# Patient Record
Sex: FEMALE | Race: BLACK OR AFRICAN AMERICAN | Employment: OTHER | URBAN - METROPOLITAN AREA
[De-identification: names, ages, dates, MRNs, and addresses within clinical notes are randomized per-mention and may not be internally consistent; named-entity substitution may affect disease eponyms.]

---

## 2017-10-27 ENCOUNTER — OFFICE VISIT (OUTPATIENT)
Dept: INTERNAL MEDICINE CLINIC | Age: 62
End: 2017-10-27

## 2017-10-27 VITALS
SYSTOLIC BLOOD PRESSURE: 138 MMHG | BODY MASS INDEX: 37.38 KG/M2 | RESPIRATION RATE: 16 BRPM | HEART RATE: 72 BPM | WEIGHT: 198 LBS | HEIGHT: 61 IN | DIASTOLIC BLOOD PRESSURE: 76 MMHG

## 2017-10-27 DIAGNOSIS — D50.0 IRON DEFICIENCY ANEMIA DUE TO CHRONIC BLOOD LOSS: ICD-10-CM

## 2017-10-27 DIAGNOSIS — Z00.00 PREVENTATIVE HEALTH CARE: ICD-10-CM

## 2017-10-27 DIAGNOSIS — E04.9 GOITER: ICD-10-CM

## 2017-10-27 DIAGNOSIS — E66.09 CLASS 2 OBESITY DUE TO EXCESS CALORIES WITHOUT SERIOUS COMORBIDITY WITH BODY MASS INDEX (BMI) OF 37.0 TO 37.9 IN ADULT: ICD-10-CM

## 2017-10-27 DIAGNOSIS — D50.0 IRON DEFICIENCY ANEMIA DUE TO CHRONIC BLOOD LOSS: Primary | ICD-10-CM

## 2017-10-27 LAB
ALBUMIN SERPL-MCNC: 4.4 G/DL (ref 3.4–5)
ANION GAP SERPL CALCULATED.3IONS-SCNC: 15 MMOL/L (ref 3–16)
BASOPHILS ABSOLUTE: 0 K/UL (ref 0–0.2)
BASOPHILS RELATIVE PERCENT: 0.8 %
BUN BLDV-MCNC: 11 MG/DL (ref 7–20)
CALCIUM SERPL-MCNC: 10.3 MG/DL (ref 8.3–10.6)
CHLORIDE BLD-SCNC: 98 MMOL/L (ref 99–110)
CHOLESTEROL, TOTAL: 211 MG/DL (ref 0–199)
CO2: 28 MMOL/L (ref 21–32)
CREAT SERPL-MCNC: 0.7 MG/DL (ref 0.6–1.2)
EOSINOPHILS ABSOLUTE: 0.1 K/UL (ref 0–0.6)
EOSINOPHILS RELATIVE PERCENT: 2.4 %
FERRITIN: 20.2 NG/ML (ref 15–150)
GFR AFRICAN AMERICAN: >60
GFR NON-AFRICAN AMERICAN: >60
GLUCOSE BLD-MCNC: 87 MG/DL (ref 70–99)
HCT VFR BLD CALC: 37.4 % (ref 36–48)
HDLC SERPL-MCNC: 47 MG/DL (ref 40–60)
HEMOGLOBIN: 12 G/DL (ref 12–16)
HEPATITIS C ANTIBODY INTERPRETATION: NORMAL
IRON SATURATION: 23 % (ref 15–50)
IRON: 88 UG/DL (ref 37–145)
LDL CHOLESTEROL CALCULATED: 133 MG/DL
LYMPHOCYTES ABSOLUTE: 1.4 K/UL (ref 1–5.1)
LYMPHOCYTES RELATIVE PERCENT: 30.8 %
MCH RBC QN AUTO: 27.2 PG (ref 26–34)
MCHC RBC AUTO-ENTMCNC: 32.2 G/DL (ref 31–36)
MCV RBC AUTO: 84.7 FL (ref 80–100)
MONOCYTES ABSOLUTE: 0.4 K/UL (ref 0–1.3)
MONOCYTES RELATIVE PERCENT: 9.1 %
NEUTROPHILS ABSOLUTE: 2.6 K/UL (ref 1.7–7.7)
NEUTROPHILS RELATIVE PERCENT: 56.9 %
PDW BLD-RTO: 15.1 % (ref 12.4–15.4)
PHOSPHORUS: 4.3 MG/DL (ref 2.5–4.9)
PLATELET # BLD: 288 K/UL (ref 135–450)
PMV BLD AUTO: 9 FL (ref 5–10.5)
POTASSIUM SERPL-SCNC: 4.7 MMOL/L (ref 3.5–5.1)
RBC # BLD: 4.42 M/UL (ref 4–5.2)
SODIUM BLD-SCNC: 141 MMOL/L (ref 136–145)
T4 FREE: 1.5 NG/DL (ref 0.9–1.8)
TOTAL IRON BINDING CAPACITY: 384 UG/DL (ref 260–445)
TRIGL SERPL-MCNC: 157 MG/DL (ref 0–150)
TSH SERPL DL<=0.05 MIU/L-ACNC: 1 UIU/ML (ref 0.27–4.2)
VITAMIN B-12: 1472 PG/ML (ref 211–911)
VLDLC SERPL CALC-MCNC: 31 MG/DL
WBC # BLD: 4.6 K/UL (ref 4–11)

## 2017-10-27 PROCEDURE — G0008 ADMIN INFLUENZA VIRUS VAC: HCPCS | Performed by: INTERNAL MEDICINE

## 2017-10-27 PROCEDURE — 99203 OFFICE O/P NEW LOW 30 MIN: CPT | Performed by: INTERNAL MEDICINE

## 2017-10-27 PROCEDURE — 90686 IIV4 VACC NO PRSV 0.5 ML IM: CPT | Performed by: INTERNAL MEDICINE

## 2017-10-27 RX ORDER — THIAMINE HCL 100 MG
2500 TABLET ORAL DAILY
COMMUNITY
End: 2020-06-30 | Stop reason: DRUGHIGH

## 2017-10-27 ASSESSMENT — PATIENT HEALTH QUESTIONNAIRE - PHQ9
SUM OF ALL RESPONSES TO PHQ9 QUESTIONS 1 & 2: 0
SUM OF ALL RESPONSES TO PHQ QUESTIONS 1-9: 0
2. FEELING DOWN, DEPRESSED OR HOPELESS: 0
1. LITTLE INTEREST OR PLEASURE IN DOING THINGS: 0

## 2017-10-27 NOTE — PROGRESS NOTES
Vaccine Information Sheet, \"Influenza - Inactivated\"  given to Flory Haas, or parent/legal guardian of  Flory Haas and verbalized understanding. Patient responses:    Have you ever had a reaction to a flu vaccine? No  Are you able to eat eggs without adverse effects? Yes  Do you have any current illness? No  Have you ever had Guillian Peggs Syndrome? No    Flu vaccine given per order. Please see immunization tab.

## 2017-10-28 LAB
ESTIMATED AVERAGE GLUCOSE: 134.1 MG/DL
HBA1C MFR BLD: 6.3 %

## 2017-10-28 NOTE — PROGRESS NOTES
Oleksandr Holman  YOB: 1955    Date of Service:  10/27/2017    /76 (Site: Right Arm, Position: Sitting, Cuff Size: Large Adult)   Pulse 72 Comment: Regular  Resp 16   Ht 5' 1.25\" (1.556 m)   Wt 198 lb (89.8 kg)   LMP 10/12/2017   BMI 37.11 kg/m²       No Known Allergies  Outpatient Prescriptions Marked as Taking for the 10/27/17 encounter (Office Visit) with Tien Sam MD   Medication Sig Dispense Refill    Iron-Vitamin C (VITRON-C)  MG TABS Take 1 tablet by mouth daily      vitamin D 1000 units CAPS Take 1 capsule by mouth daily      Cyanocobalamin (VITAMIN B-12) 2500 MCG SUBL Place 2,500 mcg under the tongue daily      Multiple Vitamins-Minerals (CENTRUM SILVER 50+WOMEN PO) Take 1 tablet by mouth daily         History reviewed. No pertinent past medical history. Past Surgical History:   Procedure Laterality Date    LAPAROSCOPY      SHOULDER SURGERY         the medical history, surgical history, medications, and allergies listed above were verified by me with the patient and are currently accurate to the best of his / her and my knowledge    No family history on file. Social History     Social History    Marital status:      Spouse name: N/A    Number of children: N/A    Years of education: N/A     Occupational History    Not on file.      Social History Main Topics    Smoking status: Never Smoker    Smokeless tobacco: Never Used    Alcohol use No    Drug use: No    Sexual activity: Yes     Partners: Male     Other Topics Concern    Not on file     Social History Narrative    No narrative on file       Smoking history reviewed, cessation counseling performed: n/a    Chief Complaint   Patient presents with    New Patient       HPI    establish care, she feels well, she has Anemia and has been on iron infusions in the past, GI work up was negative and she denies bleeding, she is now on oral iron and vitamins, she had a thoracic mass biopsy which Health systems    I have ordered outside records where not available through EMR    Assessment:      ICD-10-CM ICD-9-CM    1. Iron deficiency anemia due to chronic blood loss D50.0 280.0 CBC WITH AUTO DIFFERENTIAL      RENAL FUNCTION PANEL      FERRITIN      IRON AND TIBC      VITAMIN B12   2. Class 2 obesity due to excess calories without serious comorbidity with body mass index (BMI) of 37.0 to 37.9 in adult E66.09 278.00     Z68.37 V85.37    3. Goiter E04.9 240.9 TSH without Reflex      T4, Free   4. Preventative health care Z00.00 V70.0 LIPID PANEL      HEMOGLOBIN A1C      HEPATITIS C ANTIBODY      HIV-1 AND HIV-2 ANTIBODIES           Plan:    Check CBC, B12 and iron levels  Check TFTs  Screen for DM, Cholesterol, Hep C, HIV  Vaccinate today    Orders Placed This Encounter   Procedures    INFLUENZA, QUADV, 3 YRS AND OLDER, IM, PF, PREFILL SYR OR SDV, 0.5ML (FLUZONE QUADV, PF)    CBC WITH AUTO DIFFERENTIAL    RENAL FUNCTION PANEL    TSH without Reflex    T4, Free    LIPID PANEL    HEMOGLOBIN A1C    HEPATITIS C ANTIBODY    HIV-1 AND HIV-2 ANTIBODIES    FERRITIN    IRON AND TIBC    VITAMIN B12          Medication List          Accurate as of 10/27/17  9:02 PM. If you have any questions, ask your nurse or doctor.                CONTINUE taking these medications    CENTRUM SILVER 50+WOMEN PO     Vitamin B-12 2500 MCG Subl     vitamin D 1000 units Caps     VITRON-C  MG Tabs  Generic drug:  Iron-Vitamin C            All medications, orders, and other aspects of the management plan were discussed and explained to the patient, all questions were answered fully, relevant significant possible side effects were described and appropriate precautions recommended, the patient expressed good understanding    25 mins spent counseling patient on health maintenance, healthy diet, exercise, other therapeutic lifestyle changes, guideline indicated screening and vaccines for preventable diseases, addressing patient's

## 2017-10-30 LAB — HIV-1 AND HIV-2 ANTIBODIES: NORMAL

## 2017-10-30 RX ORDER — METFORMIN HYDROCHLORIDE 500 MG/1
500 TABLET, EXTENDED RELEASE ORAL
Qty: 90 TABLET | Refills: 2 | Status: SHIPPED | OUTPATIENT
Start: 2017-10-30 | End: 2018-05-03 | Stop reason: SDUPTHER

## 2018-04-02 ENCOUNTER — HOSPITAL ENCOUNTER (OUTPATIENT)
Dept: MAMMOGRAPHY | Age: 63
Discharge: HOME | End: 2018-04-02
Attending: INTERNAL MEDICINE | Admitting: INTERNAL MEDICINE

## 2018-04-02 DIAGNOSIS — Z12.31 VISIT FOR SCREENING MAMMOGRAM: ICD-10-CM

## 2018-05-02 ENCOUNTER — OFFICE VISIT (OUTPATIENT)
Dept: INTERNAL MEDICINE CLINIC | Age: 63
End: 2018-05-02

## 2018-05-02 VITALS
WEIGHT: 196 LBS | SYSTOLIC BLOOD PRESSURE: 138 MMHG | BODY MASS INDEX: 37 KG/M2 | HEART RATE: 76 BPM | DIASTOLIC BLOOD PRESSURE: 84 MMHG | RESPIRATION RATE: 16 BRPM | HEIGHT: 61 IN

## 2018-05-02 DIAGNOSIS — G47.33 OSA (OBSTRUCTIVE SLEEP APNEA): ICD-10-CM

## 2018-05-02 DIAGNOSIS — E66.9 OBESITY (BMI 35.0-39.9 WITHOUT COMORBIDITY): ICD-10-CM

## 2018-05-02 DIAGNOSIS — H81.10 BENIGN PAROXYSMAL POSITIONAL VERTIGO, UNSPECIFIED LATERALITY: Primary | ICD-10-CM

## 2018-05-02 DIAGNOSIS — E78.2 MIXED HYPERLIPIDEMIA: ICD-10-CM

## 2018-05-02 DIAGNOSIS — R73.03 PRE-DIABETES: ICD-10-CM

## 2018-05-02 PROCEDURE — G8427 DOCREV CUR MEDS BY ELIG CLIN: HCPCS | Performed by: INTERNAL MEDICINE

## 2018-05-02 PROCEDURE — 1036F TOBACCO NON-USER: CPT | Performed by: INTERNAL MEDICINE

## 2018-05-02 PROCEDURE — 99214 OFFICE O/P EST MOD 30 MIN: CPT | Performed by: INTERNAL MEDICINE

## 2018-05-02 PROCEDURE — G8417 CALC BMI ABV UP PARAM F/U: HCPCS | Performed by: INTERNAL MEDICINE

## 2018-05-02 PROCEDURE — 3017F COLORECTAL CA SCREEN DOC REV: CPT | Performed by: INTERNAL MEDICINE

## 2018-05-02 RX ORDER — PAROXETINE 10 MG/1
10 TABLET, FILM COATED ORAL EVERY EVENING
Qty: 30 TABLET | Refills: 3 | Status: SHIPPED | OUTPATIENT
Start: 2018-05-02 | End: 2018-10-04

## 2018-05-03 RX ORDER — METFORMIN HYDROCHLORIDE 500 MG/1
500 TABLET, EXTENDED RELEASE ORAL
Qty: 90 TABLET | Refills: 2 | Status: SHIPPED | OUTPATIENT
Start: 2018-05-03 | End: 2018-05-04 | Stop reason: SDUPTHER

## 2018-05-07 RX ORDER — METFORMIN HYDROCHLORIDE 500 MG/1
500 TABLET, EXTENDED RELEASE ORAL
Qty: 90 TABLET | Refills: 2 | Status: SHIPPED | OUTPATIENT
Start: 2018-05-07 | End: 2018-05-07 | Stop reason: SDUPTHER

## 2018-05-07 RX ORDER — METFORMIN HYDROCHLORIDE 500 MG/1
500 TABLET, EXTENDED RELEASE ORAL
Qty: 90 TABLET | Refills: 2 | Status: SHIPPED | OUTPATIENT
Start: 2018-05-07 | End: 2019-06-17 | Stop reason: SDUPTHER

## 2018-05-08 ASSESSMENT — ENCOUNTER SYMPTOMS
DIARRHEA: 0
TROUBLE SWALLOWING: 0
COUGH: 0
BACK PAIN: 0
SHORTNESS OF BREATH: 0
BLOOD IN STOOL: 0
SORE THROAT: 0
ABDOMINAL DISTENTION: 0
APNEA: 0
NAUSEA: 0
EYE DISCHARGE: 0
VOMITING: 0
RHINORRHEA: 0
CONSTIPATION: 0
ABDOMINAL PAIN: 0
EYE PAIN: 0
WHEEZING: 0

## 2018-07-20 ENCOUNTER — OFFICE VISIT (OUTPATIENT)
Dept: GYNECOLOGY | Age: 63
End: 2018-07-20

## 2018-07-20 VITALS
SYSTOLIC BLOOD PRESSURE: 130 MMHG | HEART RATE: 78 BPM | DIASTOLIC BLOOD PRESSURE: 67 MMHG | BODY MASS INDEX: 37.22 KG/M2 | WEIGHT: 197 LBS

## 2018-07-20 DIAGNOSIS — Z76.89 ENCOUNTER TO ESTABLISH CARE: ICD-10-CM

## 2018-07-20 DIAGNOSIS — Z12.39 SCREENING FOR BREAST CANCER: ICD-10-CM

## 2018-07-20 DIAGNOSIS — Z12.4 SCREENING FOR CERVICAL CANCER: ICD-10-CM

## 2018-07-20 DIAGNOSIS — Z01.419 WOMEN'S ANNUAL ROUTINE GYNECOLOGICAL EXAMINATION: Primary | ICD-10-CM

## 2018-07-20 PROCEDURE — 99386 PREV VISIT NEW AGE 40-64: CPT | Performed by: NURSE PRACTITIONER

## 2018-07-20 NOTE — PATIENT INSTRUCTIONS
risk for heart attack and stroke. · Blood pressure. Have your blood pressure checked during a routine doctor visit. Your doctor will tell you how often to check your blood pressure based on your age, your blood pressure results, and other factors. · Mammogram. Ask your doctor how often you should have a mammogram, which is an X-ray of your breasts. A mammogram can spot breast cancer before it can be felt and when it is easiest to treat. · Pap test and pelvic exam. Ask your doctor how often you should have a Pap test. You may not need to have a Pap test as often as you used to. · Vision. Have your eyes checked every year or two or as often as your doctor suggests. Some experts recommend that you have yearly exams for glaucoma and other age-related eye problems starting at age 48. · Hearing. Tell your doctor if you notice any change in your hearing. You can have tests to find out how well you hear. · Diabetes. Ask your doctor whether you should have tests for diabetes. · Colon cancer. You should begin tests for colon cancer at age 48. You may have one of several tests. Your doctor will tell you how often to have tests based on your age and risk. Risks include whether you already had a precancerous polyp removed from your colon or whether your parents, sisters and brothers, or children have had colon cancer. · Thyroid disease. Talk to your doctor about whether to have your thyroid checked as part of a regular physical exam. Women have an increased chance of a thyroid problem. · Osteoporosis. You should begin tests for bone density at age 72. If you are younger than 72, ask your doctor whether you have factors that may increase your risk for this disease. You may want to have this test before age 72. · Heart attack and stroke risk. At least every 4 to 6 years, you should have your risk for heart attack and stroke assessed.  Your doctor uses factors such as your age, blood pressure, cholesterol, and whether you exam. Your doctor carefully feels your breasts and under your arms to check for lumps or other changes. After the screening, your doctor will tell you the results. You will also be told if you need any follow-up tests. When should you get screened? Talk with your doctor about when you should start being tested for breast cancer. How often you get tested and the kind of tests you get will depend on your age and your risk. The guidelines that follow are for women who have an average risk for breast cancer. If you have a higher risk for breast cancer, such as having a family history of breast cancer in multiple relatives or at a young age, your doctor may recommend different screening for you. · Ages 21 to 44: Some experts recommend that women have a clinical breast exam every 3 years, starting at age 21. Ask your doctor how often you should have this test. If you have a high risk for breast cancer, talk with your doctor about when to start yearly mammograms and other screening tests. · Ages 36 and older: Talk with your doctor about how often you should have mammograms and clinical breast exams. What is your risk for breast cancer? If you don't already know your risk of breast cancer, you can ask your doctor about it. You can also look it up at www.cancer.gov/bcrisktool/. If your doctor says that you have a high or very high risk, ask about ways to reduce your risk. These could include getting extra screening, taking medicine, or having surgery. If you have a strong family history of breast cancer, ask your doctor about genetic testing. What steps can you take to stay healthy? Some things that increase your risk of breast cancer, such as your age and being female, cannot be controlled. But you can do some things to stay as healthy as you can. · Learn what your breasts normally look and feel like. If you notice any changes, tell your doctor. · Drink alcohol wisely. Your risk goes up the more you drink.  For learn more about \"Breast Self-Exam: Care Instructions. \"     If you do not have an account, please click on the \"Sign Up Now\" link. Current as of: May 12, 2017  Content Version: 11.6  © 4863-0326 [employer]. Care instructions adapted under license by [facility] ([facility]). If you have questions about a medical condition or this instruction, always ask your healthcare professional. [address] any warranty or liability for your use of this information. Patient Education        Eating Healthy Foods: Care Instructions  Your Care Instructions    Eating healthy foods can help lower your risk for disease. Healthy food gives you energy and keeps your heart strong, your brain active, your muscles working, and your bones strong. A healthy diet includes a variety of foods from the basic food groups: grains, vegetables, fruits, milk and milk products, and meat and beans. Some people may eat more of their favorite foods from only one food group and, as a result, miss getting the nutrients they need. So, it is important to pay attention not only to what you eat but also to what you are missing from your diet. You can eat a healthy, balanced diet by making a few small changes. Follow-up care is a key part of your treatment and safety. Be sure to make and go to all appointments, and call your doctor if you are having problems. It's also a good idea to know your test results and keep a list of the medicines you take. How can you care for yourself at home? Look at what you eat  · Keep a food diary for a week or two and record everything you eat or drink. Track the number of servings you eat from each food group. · For a balanced diet every day, eat a variety of:  ¨ 6 or more ounce-equivalents of grains, such as cereals, breads, crackers, rice, or pasta, every day.  An ounce-equivalent is 1 slice of bread, 1 cup of ready-to-eat cereal, or ½ cup of cooked rice, cooked pasta, or cooked not cut them out entirely. · Eat a wide variety of foods. Make healthy choices when eating out  · The type of restaurant you choose can help you make healthy choices. Even fast-food chains are now offering more low-fat or healthier choices on the menu. · Choose smaller portions, or take half of your meal home. · When eating out, try:  ¨ A veggie pizza with a whole wheat crust or grilled chicken (instead of sausage or pepperoni). ¨ Pasta with roasted vegetables, grilled chicken, or marinara sauce instead of cream sauce. ¨ A vegetable wrap or grilled chicken wrap. ¨ Broiled or poached food instead of fried or breaded items. Make healthy choices easy  · Buy packaged, prewashed, ready-to-eat fresh vegetables and fruits, such as baby carrots, salad mixes, and chopped or shredded broccoli and cauliflower. · Buy packaged, presliced fruits, such as melon or pineapple. · Choose 100% fruit or vegetable juice instead of soda. Limit juice intake to 4 to 6 oz (½ to ¾ cup) a day. · Blend low-fat yogurt, fruit juice, and canned or frozen fruit to make a smoothie for breakfast or a snack. Where can you learn more? Go to https://Ecovision.YadaHome. org and sign in to your [employer] account. Enter L636 in the [facility] box to learn more about \"Eating Healthy Foods: Care Instructions. \"     If you do not have an account, please click on the \"Sign Up Now\" link. Current as of: May 12, 2017  Content Version: 11.6  © 1302-8657 [employer], [employer]ipple[employer]. Care instructions adapted under license by [facility] ([facility]). If you have questions about a medical condition or this instruction, always ask your healthcare professional. [place] [address] any warranty or liability for your use of this information. Patient Education        Learning About Physical Activity  What is physical activity? Physical activity is any kind of activity that gets your body moving.   The types of physical activity that can help you get fit and stay healthy include:  · Aerobic or \"cardio\" activities that make your heart beat faster and make you breathe harder, such as brisk walking, riding a bike, or running. Aerobic activities strengthen your heart and lungs and build up your endurance. · Strength training activities that make your muscles work against, or \"resist,\" something, such as lifting weights or doing push-ups. These activities help tone and strengthen your muscles. · Stretches that allow you to move your joints and muscles through their full range of motion. Stretching helps you be more flexible and avoid injury. What are the benefits of physical activity? Being active is one of the best things you can do to get fit and stay healthy. It helps you to:  · Feel stronger and have more energy to do all the things you like to do. · Focus better at school or work and perform better in sports. · Feel, think, and sleep better. · Reach and stay at a healthy weight. · Lose fat and build lean muscle. · Lower your risk for serious health problems. · Keep your bones, muscles, and joints strong. Being fit lets you do more physical activity. And it lets you work out harder without as much effort. How can you make physical activity part of your life? Get at least 30 minutes of exercise on most days of the week. Walking is a good choice. You also may want to do other activities, such as running, swimming, cycling, or playing tennis or team sports. Pick activities that you like-ones that make your heart beat faster, your muscles stronger, and your muscles and joints more flexible. If you find more than one thing you like doing, do them all. You don't have to do the same thing every day. Get your heart pumping every day. Any activity that makes your heart beat faster and keeps it at that rate for a while counts. Here are some great ways to get your heart beating faster:  · Go for a brisk walk, run, or bike ride.   · Go for a hike or swim. · Go in-line skating. · Play a game of touch football, basketball, or soccer. · Ride a bike. · Play tennis or racquetball. · Climb stairs. Even some household chores can be aerobic-just do them at a faster pace. Vacuuming, raking or mowing the lawn, sweeping the garage, and washing and waxing the car all can help get your heart rate up. Strengthen your muscles during the week. You don't have to lift heavy weights or grow big, bulky muscles to get stronger. Doing a few simple activities that make your muscles work against, or \"resist,\" something can help you get stronger. For example, you can:  · Do push-ups or sit-ups, which use your own body weight as resistance. · Lift weights or dumbbells or use stretch bands at home or in a gym or community center. Stretch your muscles often. Stretching will help you as you become more active. It can help you stay flexible, loosen tight muscles, and avoid injury. It can also help improve your balance and posture and can be a great way to relax. Be sure to stretch the muscles you'll be using when you work out. It's best to warm your muscles slightly before you stretch them. Walk or do some other light aerobic activity for a few minutes, and then start stretching. When you stretch your muscles:  · Do it slowly. Stretching is not about going fast or making sudden movements. · Don't push or bounce during a stretch. · Hold each stretch for at least 15 to 30 seconds, if you can. You should feel a stretch in the muscle, but not pain. · Breathe out as you do the stretch. Then breathe in as you hold the stretch. Don't hold your breath. If you're worried about how more activity might affect your health, have a checkup before you start. Follow any special advice your doctor gives you for getting a smart start. Where can you learn more? Go to https://kristal.[email or web address] org and sign in to your [employer] account.  Enter T669 in the [address]

## 2018-07-20 NOTE — PROGRESS NOTES
[facility]  Annual GYN Visit  Aurora Villa CNP    Sunil Martinez  YOB: 1955    Date of Service:  7/20/2018    Vitals:    07/20/18 0935   BP: 130/67   Site: Right Arm   Position: Sitting   Cuff Size: Large Adult   Pulse: 78   Weight: 197 lb (89.4 kg)     Body mass index is 37.22 kg/m². Wt Readings from Last 3 Encounters:   07/20/18 197 lb (89.4 kg)   05/02/18 196 lb (88.9 kg)   10/27/17 198 lb (89.8 kg)     BP Readings from Last 3 Encounters:   07/20/18 130/67   05/02/18 138/84   10/27/17 138/76       Chief Complaint:   Sunil Martinez is a 66 y.o. female who presents as a new patient for routine annual gynecologic visit. HPI:  Patient is nulliparous. She is perimenopausal- reports lmp in 3/2018. Menarche at 17years of age years. She denies dyspareunia, pelvic pain, vaginal discharge, vulvar/vaginal itching, vulvar/vaginal irritation/pain, urinary frequency, urinary urgency and urinary incontinence. Menopausal/perimenopausal symptoms: hot flashes, insomnia. Hormone therapy side effects: none. Patient is currently sexually active; monogamous with spouse. No Known Allergies  Outpatient Prescriptions Marked as Taking for the 7/20/18 encounter (Office Visit) with Rachell Pill, SALVATORE - CNP   Medication Sig Dispense Refill    metFORMIN (GLUCOPHAGE-XR) 500 MG extended release tablet Take 1 tablet by mouth daily (with breakfast) 90 tablet 2    atorvastatin (LIPITOR) 10 MG tablet Take 1 tablet by mouth daily 30 tablet 5    Iron-Vitamin C (VITRON-C)  MG TABS Take 1 tablet by mouth daily      vitamin D 1000 units CAPS Take 1 capsule by mouth daily      Cyanocobalamin (VITAMIN B-12) 2500 MCG SUBL Place 2,500 mcg under the tongue daily      Multiple Vitamins-Minerals (CENTRUM SILVER 50+WOMEN PO) Take 1 tablet by mouth daily         Review of Systems:  A comprehensive review of systems was negative except for what was noted in the HPI.     Physical Exam:  Constitutional: She is oriented to person, place, and time. She appears well-developed and well-nourished. No distress. Neck: No mass and no thyromegaly present. Cardiovascular: Normal rate, regular rhythm and normal heart sounds. No murmur heard. Pulmonary/Chest: Effort and breath sounds normal.   Abdominal: Soft, non-tender. No distension or masses. Genitourinary: normal external genitalia, vulva, vagina, cervix, uterus and adnexa and urethral meatus normal and bladder not palpable. Breast exam:  breasts appear normal, no suspicious masses, no skin or nipple changes or axillary nodes. Neurological: She is alert and oriented to person, place, and time. Skin: Skin is warm and dry. No rash noted. No erythema. Psychiatric: She has a normal mood and affect. Her behavior is normal.     Preventive Care and Risk Factor Assessment:  Hx abnormal PAP: no; last 5/30/2017  Self-breast exams: yes  Hx of abnormal mammogram: yes - biopsy left breast in 2012- calcifications ; last 4/2/2018- normal  Colonoscopy: yes; last 5/19/2017- polyps due in 2022  FH of breast cancer: no  FH of GYN cancer: yes - MGM with cervical cancer   Previous DEXA scan: no  Exercise: no regular exercise  Social History   Substance Use Topics    Smoking status: Never Smoker    Smokeless tobacco: Never Used    Alcohol use No      History   Sexual Activity    Sexual activity: Yes    Partners: Male     Hx of STD: no     Health Maintenance   Topic Date Due    Cervical cancer screen  04/04/1978    Shingles Vaccine (1 of 2 - 2 Dose Series) 04/04/2007    Flu vaccine (1) 09/01/2018    A1C test (Diabetic or Prediabetic)  05/02/2019    Breast cancer screen  04/02/2020    DTaP/Tdap/Td vaccine (2 - Td) 09/23/2020    Colon cancer screen colonoscopy  05/19/2022    Lipid screen  05/02/2023    Hepatitis C screen  Completed    HIV screen  Completed        Assessment/Plan:  1. Encounter to establish care  2.  Women's annual routine

## 2018-07-25 LAB
HPV COMMENT: NORMAL
HPV TYPE 16: NOT DETECTED
HPV TYPE 18: NOT DETECTED
HPVOH (OTHER TYPES): NOT DETECTED

## 2018-10-04 ENCOUNTER — OFFICE VISIT (OUTPATIENT)
Dept: INTERNAL MEDICINE CLINIC | Age: 63
End: 2018-10-04
Payer: MEDICAID

## 2018-10-04 VITALS
OXYGEN SATURATION: 98 % | HEART RATE: 84 BPM | RESPIRATION RATE: 14 BRPM | DIASTOLIC BLOOD PRESSURE: 84 MMHG | WEIGHT: 194 LBS | SYSTOLIC BLOOD PRESSURE: 158 MMHG | HEIGHT: 61 IN | BODY MASS INDEX: 36.63 KG/M2

## 2018-10-04 DIAGNOSIS — Z23 NEEDS FLU SHOT: ICD-10-CM

## 2018-10-04 DIAGNOSIS — D50.9 IRON DEFICIENCY ANEMIA, UNSPECIFIED IRON DEFICIENCY ANEMIA TYPE: ICD-10-CM

## 2018-10-04 DIAGNOSIS — E04.9 GOITER: ICD-10-CM

## 2018-10-04 DIAGNOSIS — R73.03 PRE-DIABETES: ICD-10-CM

## 2018-10-04 DIAGNOSIS — D50.0 IRON DEFICIENCY ANEMIA DUE TO CHRONIC BLOOD LOSS: ICD-10-CM

## 2018-10-04 DIAGNOSIS — E78.00 HIGH CHOLESTEROL: Primary | ICD-10-CM

## 2018-10-04 DIAGNOSIS — E78.00 HIGH CHOLESTEROL: ICD-10-CM

## 2018-10-04 DIAGNOSIS — Z11.59 NEED FOR HEPATITIS C SCREENING TEST: ICD-10-CM

## 2018-10-04 DIAGNOSIS — Z23 NEED FOR PROPHYLACTIC VACCINATION AND INOCULATION AGAINST VARICELLA: ICD-10-CM

## 2018-10-04 DIAGNOSIS — I10 ESSENTIAL HYPERTENSION: ICD-10-CM

## 2018-10-04 LAB
A/G RATIO: 1.8 (ref 1.1–2.2)
ALBUMIN SERPL-MCNC: 4.2 G/DL (ref 3.4–5)
ALP BLD-CCNC: 109 U/L (ref 40–129)
ALT SERPL-CCNC: 19 U/L (ref 10–40)
ANION GAP SERPL CALCULATED.3IONS-SCNC: 15 MMOL/L (ref 3–16)
AST SERPL-CCNC: 18 U/L (ref 15–37)
BASOPHILS ABSOLUTE: 0.1 K/UL (ref 0–0.2)
BASOPHILS RELATIVE PERCENT: 1.2 %
BILIRUB SERPL-MCNC: <0.2 MG/DL (ref 0–1)
BUN BLDV-MCNC: 11 MG/DL (ref 7–20)
CALCIUM SERPL-MCNC: 9.6 MG/DL (ref 8.3–10.6)
CHLORIDE BLD-SCNC: 104 MMOL/L (ref 99–110)
CHOLESTEROL, TOTAL: 167 MG/DL (ref 0–199)
CO2: 26 MMOL/L (ref 21–32)
CREAT SERPL-MCNC: 0.7 MG/DL (ref 0.6–1.2)
EOSINOPHILS ABSOLUTE: 0.1 K/UL (ref 0–0.6)
EOSINOPHILS RELATIVE PERCENT: 3 %
FERRITIN: 74.1 NG/ML (ref 15–150)
GFR AFRICAN AMERICAN: >60
GFR NON-AFRICAN AMERICAN: >60
GLOBULIN: 2.4 G/DL
GLUCOSE BLD-MCNC: 101 MG/DL (ref 70–99)
HCT VFR BLD CALC: 38.6 % (ref 36–48)
HDLC SERPL-MCNC: 47 MG/DL (ref 40–60)
HEMOGLOBIN: 12.8 G/DL (ref 12–16)
HEPATITIS C ANTIBODY INTERPRETATION: NORMAL
IRON SATURATION: 26 % (ref 15–50)
IRON: 77 UG/DL (ref 37–145)
LDL CHOLESTEROL CALCULATED: 96 MG/DL
LYMPHOCYTES ABSOLUTE: 1.5 K/UL (ref 1–5.1)
LYMPHOCYTES RELATIVE PERCENT: 31.6 %
MCH RBC QN AUTO: 29 PG (ref 26–34)
MCHC RBC AUTO-ENTMCNC: 33.3 G/DL (ref 31–36)
MCV RBC AUTO: 87.3 FL (ref 80–100)
MONOCYTES ABSOLUTE: 0.4 K/UL (ref 0–1.3)
MONOCYTES RELATIVE PERCENT: 7.9 %
NEUTROPHILS ABSOLUTE: 2.6 K/UL (ref 1.7–7.7)
NEUTROPHILS RELATIVE PERCENT: 56.3 %
PDW BLD-RTO: 14.4 % (ref 12.4–15.4)
PLATELET # BLD: 264 K/UL (ref 135–450)
PMV BLD AUTO: 8.6 FL (ref 5–10.5)
POTASSIUM SERPL-SCNC: 4.4 MMOL/L (ref 3.5–5.1)
RBC # BLD: 4.42 M/UL (ref 4–5.2)
SODIUM BLD-SCNC: 145 MMOL/L (ref 136–145)
TOTAL IRON BINDING CAPACITY: 292 UG/DL (ref 260–445)
TOTAL PROTEIN: 6.6 G/DL (ref 6.4–8.2)
TRIGL SERPL-MCNC: 119 MG/DL (ref 0–150)
TSH REFLEX: 0.7 UIU/ML (ref 0.27–4.2)
VLDLC SERPL CALC-MCNC: 24 MG/DL
WBC # BLD: 4.7 K/UL (ref 4–11)

## 2018-10-04 PROCEDURE — 90686 IIV4 VACC NO PRSV 0.5 ML IM: CPT | Performed by: INTERNAL MEDICINE

## 2018-10-04 PROCEDURE — G8427 DOCREV CUR MEDS BY ELIG CLIN: HCPCS | Performed by: INTERNAL MEDICINE

## 2018-10-04 PROCEDURE — 93000 ELECTROCARDIOGRAM COMPLETE: CPT | Performed by: INTERNAL MEDICINE

## 2018-10-04 PROCEDURE — 99214 OFFICE O/P EST MOD 30 MIN: CPT | Performed by: INTERNAL MEDICINE

## 2018-10-04 PROCEDURE — 3017F COLORECTAL CA SCREEN DOC REV: CPT | Performed by: INTERNAL MEDICINE

## 2018-10-04 PROCEDURE — G0008 ADMIN INFLUENZA VIRUS VAC: HCPCS | Performed by: INTERNAL MEDICINE

## 2018-10-04 PROCEDURE — 1036F TOBACCO NON-USER: CPT | Performed by: INTERNAL MEDICINE

## 2018-10-04 PROCEDURE — G8417 CALC BMI ABV UP PARAM F/U: HCPCS | Performed by: INTERNAL MEDICINE

## 2018-10-04 PROCEDURE — G8482 FLU IMMUNIZE ORDER/ADMIN: HCPCS | Performed by: INTERNAL MEDICINE

## 2018-10-04 RX ORDER — AMLODIPINE BESYLATE 5 MG/1
5 TABLET ORAL DAILY
Qty: 30 TABLET | Refills: 3 | Status: SHIPPED | OUTPATIENT
Start: 2018-10-04 | End: 2018-10-22 | Stop reason: SDUPTHER

## 2018-10-04 ASSESSMENT — PATIENT HEALTH QUESTIONNAIRE - PHQ9
SUM OF ALL RESPONSES TO PHQ QUESTIONS 1-9: 0
SUM OF ALL RESPONSES TO PHQ9 QUESTIONS 1 & 2: 0
2. FEELING DOWN, DEPRESSED OR HOPELESS: 0
SUM OF ALL RESPONSES TO PHQ QUESTIONS 1-9: 0
1. LITTLE INTEREST OR PLEASURE IN DOING THINGS: 0

## 2018-10-04 ASSESSMENT — ENCOUNTER SYMPTOMS
SINUS PRESSURE: 0
PHOTOPHOBIA: 0
NAUSEA: 0
SHORTNESS OF BREATH: 0
SINUS PAIN: 0
CONSTIPATION: 0
EYE PAIN: 0
TROUBLE SWALLOWING: 0
VOMITING: 0
DIARRHEA: 0
COUGH: 0
ABDOMINAL PAIN: 0
COLOR CHANGE: 0
BACK PAIN: 0
WHEEZING: 0

## 2018-10-04 NOTE — PROGRESS NOTES
10/4/2018    Annie Oquendo (:  1955) is a 66 y.o. female, here for evaluation of the following medical concerns:    HPI  Hyperlipidemia:  No new myalgias or GI upset on atorvastatin (Lipitor). Medication compliance: compliant all of the time. Patient is  following a low fat, low cholesterol diet. She is not exercising regularly. Lab Results   Component Value Date    CHOL 208 (H) 2018    TRIG 91 2018    HDL 54 2018    LDLCALC 136 (H) 2018     No results found for: ALT, AST     Hypertension:   Elevated Bp readings at the dentist on 3 different occasions. 424P-735G systolic. Has strong family history of HTN. Sodium (mmol/L)   Date Value   10/27/2017 141    BUN (mg/dL)   Date Value   10/27/2017 11    Glucose (mg/dL)   Date Value   10/27/2017 87      Potassium (mmol/L)   Date Value   10/27/2017 4.7    CREATININE (mg/dL)   Date Value   10/27/2017 0.7         Prediabetes - down a few lbs. Wants to start exercising. Taking metofrm;in XR 500mg daily. Goiter - Discovered incidentally when she was being worked up for right shoulder surgery. She has enlarged right lobe of the thyroid that tracks down into her mediastinum. She is currently asymptomatic with no focal changes, shortness of breath or compressive symptoms. She had it biopsied and it showed normal thyroid tissue. She does not want to have surgery on this. Review of Systems   Constitutional: Negative for appetite change, fatigue, fever and unexpected weight change. No night sweats   HENT: Negative for congestion, ear pain, hearing loss, nosebleeds, sinus pain, sinus pressure, sneezing, tinnitus and trouble swallowing. Eyes: Negative for photophobia, pain and visual disturbance. Respiratory: Negative for cough, shortness of breath and wheezing. Cardiovascular: Negative for chest pain, palpitations and leg swelling.    Gastrointestinal: Negative for abdominal pain, History    Not on file. Social History Main Topics    Smoking status: Never Smoker    Smokeless tobacco: Never Used    Alcohol use No    Drug use: No    Sexual activity: Yes     Partners: Male     Other Topics Concern    Not on file     Social History Narrative    No narrative on file        Family History   Problem Relation Age of Onset    High Cholesterol Mother     Diabetes Mother     Thyroid Disease Mother     Anemia Mother     Dementia Mother     High Blood Pressure Father     Heart Failure Father     Stroke Father     Heart Disease Father     Heart Attack Father     Diabetes Sister     Heart Attack Sister 46    Arthritis Sister     Cervical Cancer Maternal Grandmother     No Known Problems Maternal Grandfather     No Known Problems Paternal Grandmother     Heart Attack Paternal Grandfather        Vitals:    10/04/18 1301 10/04/18 1350   BP: (!) 146/80 (!) 158/84   Pulse: 84    Resp: 14    SpO2: 98%    Weight: 194 lb (88 kg)    Height: 5' 1\" (1.549 m)      Estimated body mass index is 36.66 kg/m² as calculated from the following:    Height as of this encounter: 5' 1\" (1.549 m). Weight as of this encounter: 194 lb (88 kg). Physical Exam   Constitutional: She is oriented to person, place, and time. She appears well-developed and well-nourished. No distress. HENT:   Head: Normocephalic and atraumatic. Right Ear: Tympanic membrane, external ear and ear canal normal.   Left Ear: Tympanic membrane, external ear and ear canal normal.   Mouth/Throat: Oropharynx is clear and moist. No oropharyngeal exudate. Eyes: Pupils are equal, round, and reactive to light. EOM are normal. No scleral icterus. Neck: Normal range of motion. Neck supple. No JVD present. Thyromegaly (enlarged thyroid - non nodular) present. Cardiovascular: Normal rate, regular rhythm, normal heart sounds and intact distal pulses. Exam reveals no gallop and no friction rub.     No murmur heard.  Pulmonary/Chest: Effort normal and breath sounds normal. She has no wheezes. She has no rales. Abdominal: Soft. Bowel sounds are normal.   Musculoskeletal: Normal range of motion. She exhibits no edema, tenderness or deformity. Lymphadenopathy:     She has no cervical adenopathy. Neurological: She is alert and oriented to person, place, and time. Coordination normal.   Skin: Skin is warm and dry. No rash noted. She is not diaphoretic. Psychiatric: She has a normal mood and affect. Her behavior is normal.       ASSESSMENT/PLAN:  1. High cholesterol  Due for assessment after starting atorvastatin at her last visit with Dr. Jelani Song. She is not having any side effects from medication. We will continue this medication for now at this dose pending labs. No ASA for now given ?recent GI bleed? And LAITH. - Lipid Panel; Future  - COMPREHENSIVE METABOLIC PANEL; Future    2. Pre-diabetes  Encouraged exercise and weight loss. Continue metformin. No changes. 3. Essential hypertension  New diagnosis. Strong family history. EKG today with inferior q waves. Will get old EKG from [facility] to compare. Start amlodipine. If EKG shows dynamic changes from last check will consider more aggressive risk factor mgmt. - EKG 12 Lead  - amLODIPine (NORVASC) 5 MG tablet; Take 1 tablet by mouth daily  Dispense: 30 tablet; Refill: 3  - COMPREHENSIVE METABOLIC PANEL; Future    4. Needs flu shot  - Influenza, Quadv, 3 yrs and older, IM, PF, Prefill Syr or SDV, 0.5mL (FLUZONE QUADV, PF)    5. Iron deficiency anemia, unspecified iron deficiency anemia type  Had EGD and colonoscopy. Were considering capsule if anemia persisted. Will check today. - Ferritin; Future  - IRON AND TIBC; Future  - CBC Auto Differential; Future    6. Goiter  No compressive symptoms. Continue to monitor.   - TSH with Reflex; Future    7. Iron deficiency anemia due to chronic blood loss  - Ferritin; Future  - IRON AND TIBC;  Future  - CBC Auto

## 2018-10-22 ENCOUNTER — OFFICE VISIT (OUTPATIENT)
Dept: INTERNAL MEDICINE CLINIC | Age: 63
End: 2018-10-22
Payer: MEDICARE

## 2018-10-22 VITALS
OXYGEN SATURATION: 96 % | BODY MASS INDEX: 36.81 KG/M2 | DIASTOLIC BLOOD PRESSURE: 80 MMHG | RESPIRATION RATE: 14 BRPM | HEART RATE: 85 BPM | SYSTOLIC BLOOD PRESSURE: 150 MMHG | WEIGHT: 194.8 LBS

## 2018-10-22 DIAGNOSIS — I10 ESSENTIAL HYPERTENSION: ICD-10-CM

## 2018-10-22 DIAGNOSIS — E78.00 HIGH CHOLESTEROL: Primary | ICD-10-CM

## 2018-10-22 PROCEDURE — 1036F TOBACCO NON-USER: CPT | Performed by: INTERNAL MEDICINE

## 2018-10-22 PROCEDURE — 99213 OFFICE O/P EST LOW 20 MIN: CPT | Performed by: INTERNAL MEDICINE

## 2018-10-22 PROCEDURE — G8427 DOCREV CUR MEDS BY ELIG CLIN: HCPCS | Performed by: INTERNAL MEDICINE

## 2018-10-22 PROCEDURE — G8417 CALC BMI ABV UP PARAM F/U: HCPCS | Performed by: INTERNAL MEDICINE

## 2018-10-22 PROCEDURE — G8482 FLU IMMUNIZE ORDER/ADMIN: HCPCS | Performed by: INTERNAL MEDICINE

## 2018-10-22 PROCEDURE — 3017F COLORECTAL CA SCREEN DOC REV: CPT | Performed by: INTERNAL MEDICINE

## 2018-10-22 RX ORDER — AMLODIPINE BESYLATE 10 MG/1
10 TABLET ORAL DAILY
Qty: 30 TABLET | Refills: 3 | Status: SHIPPED | OUTPATIENT
Start: 2018-10-22 | End: 2019-03-19 | Stop reason: SDUPTHER

## 2018-10-22 ASSESSMENT — ENCOUNTER SYMPTOMS: SHORTNESS OF BREATH: 0

## 2018-11-19 ENCOUNTER — OFFICE VISIT (OUTPATIENT)
Dept: INTERNAL MEDICINE CLINIC | Age: 63
End: 2018-11-19
Payer: MEDICARE

## 2018-11-19 VITALS — HEART RATE: 80 BPM | DIASTOLIC BLOOD PRESSURE: 62 MMHG | SYSTOLIC BLOOD PRESSURE: 130 MMHG | RESPIRATION RATE: 14 BRPM

## 2018-11-19 DIAGNOSIS — N95.0 POSTMENOPAUSAL BLEEDING: ICD-10-CM

## 2018-11-19 DIAGNOSIS — I10 ESSENTIAL HYPERTENSION: Primary | ICD-10-CM

## 2018-11-19 PROCEDURE — G8482 FLU IMMUNIZE ORDER/ADMIN: HCPCS | Performed by: INTERNAL MEDICINE

## 2018-11-19 PROCEDURE — 99213 OFFICE O/P EST LOW 20 MIN: CPT | Performed by: INTERNAL MEDICINE

## 2018-11-19 PROCEDURE — 1036F TOBACCO NON-USER: CPT | Performed by: INTERNAL MEDICINE

## 2018-11-19 PROCEDURE — 3017F COLORECTAL CA SCREEN DOC REV: CPT | Performed by: INTERNAL MEDICINE

## 2018-11-19 PROCEDURE — G8417 CALC BMI ABV UP PARAM F/U: HCPCS | Performed by: INTERNAL MEDICINE

## 2018-11-19 PROCEDURE — G8427 DOCREV CUR MEDS BY ELIG CLIN: HCPCS | Performed by: INTERNAL MEDICINE

## 2018-11-19 ASSESSMENT — ENCOUNTER SYMPTOMS: SHORTNESS OF BREATH: 0

## 2018-12-03 DIAGNOSIS — I10 ESSENTIAL HYPERTENSION: ICD-10-CM

## 2018-12-03 RX ORDER — AMLODIPINE BESYLATE 10 MG/1
10 TABLET ORAL DAILY
Qty: 30 TABLET | Refills: 3 | Status: CANCELLED | OUTPATIENT
Start: 2018-12-03

## 2019-02-25 ENCOUNTER — HOSPITAL ENCOUNTER (OUTPATIENT)
Dept: ULTRASOUND IMAGING | Age: 64
Discharge: HOME | End: 2019-02-25
Payer: MEDICAID

## 2019-02-25 DIAGNOSIS — N95.0 POSTMENOPAUSAL BLEEDING: ICD-10-CM

## 2019-02-25 PROCEDURE — 76856 US EXAM PELVIC COMPLETE: CPT

## 2019-02-25 PROCEDURE — 76830 TRANSVAGINAL US NON-OB: CPT

## 2019-03-07 ENCOUNTER — OFFICE VISIT (OUTPATIENT)
Dept: GYNECOLOGY | Age: 64
End: 2019-03-07
Payer: MEDICAID

## 2019-03-07 VITALS
HEART RATE: 83 BPM | BODY MASS INDEX: 36.63 KG/M2 | RESPIRATION RATE: 17 BRPM | DIASTOLIC BLOOD PRESSURE: 80 MMHG | WEIGHT: 194 LBS | OXYGEN SATURATION: 96 % | HEIGHT: 61 IN | SYSTOLIC BLOOD PRESSURE: 138 MMHG | TEMPERATURE: 98 F

## 2019-03-07 DIAGNOSIS — N93.9 ABNORMAL VAGINAL BLEEDING: Primary | ICD-10-CM

## 2019-03-07 DIAGNOSIS — Z12.31 SCREENING MAMMOGRAM, ENCOUNTER FOR: ICD-10-CM

## 2019-03-07 DIAGNOSIS — D21.9 FIBROIDS: ICD-10-CM

## 2019-03-07 DIAGNOSIS — E04.9 ENLARGED THYROID: ICD-10-CM

## 2019-03-07 PROCEDURE — 1036F TOBACCO NON-USER: CPT | Performed by: OBSTETRICS & GYNECOLOGY

## 2019-03-07 PROCEDURE — G8482 FLU IMMUNIZE ORDER/ADMIN: HCPCS | Performed by: OBSTETRICS & GYNECOLOGY

## 2019-03-07 PROCEDURE — G8427 DOCREV CUR MEDS BY ELIG CLIN: HCPCS | Performed by: OBSTETRICS & GYNECOLOGY

## 2019-03-07 PROCEDURE — 99202 OFFICE O/P NEW SF 15 MIN: CPT | Performed by: OBSTETRICS & GYNECOLOGY

## 2019-03-07 PROCEDURE — G8417 CALC BMI ABV UP PARAM F/U: HCPCS | Performed by: OBSTETRICS & GYNECOLOGY

## 2019-03-07 PROCEDURE — 3017F COLORECTAL CA SCREEN DOC REV: CPT | Performed by: OBSTETRICS & GYNECOLOGY

## 2019-03-10 ASSESSMENT — ENCOUNTER SYMPTOMS
EYES NEGATIVE: 1
RESPIRATORY NEGATIVE: 1
GASTROINTESTINAL NEGATIVE: 1

## 2019-03-16 RX ORDER — ATORVASTATIN CALCIUM 10 MG/1
10 TABLET, FILM COATED ORAL DAILY
Qty: 30 TABLET | Refills: 0 | Status: SHIPPED | OUTPATIENT
Start: 2019-03-16 | End: 2019-08-10 | Stop reason: SDUPTHER

## 2019-03-19 DIAGNOSIS — I10 ESSENTIAL HYPERTENSION: ICD-10-CM

## 2019-03-19 RX ORDER — AMLODIPINE BESYLATE 10 MG/1
TABLET ORAL
Qty: 30 TABLET | Refills: 3 | Status: SHIPPED | OUTPATIENT
Start: 2019-03-19 | End: 2019-08-10 | Stop reason: SDUPTHER

## 2019-04-02 ENCOUNTER — HOSPITAL ENCOUNTER (OUTPATIENT)
Dept: MAMMOGRAPHY | Age: 64
Discharge: HOME | End: 2019-04-02
Payer: MEDICAID

## 2019-04-02 ENCOUNTER — HOSPITAL ENCOUNTER (OUTPATIENT)
Dept: ULTRASOUND IMAGING | Age: 64
Discharge: HOME | End: 2019-04-02
Payer: MEDICAID

## 2019-04-02 DIAGNOSIS — Z12.31 SCREENING MAMMOGRAM, ENCOUNTER FOR: ICD-10-CM

## 2019-04-02 DIAGNOSIS — E04.9 ENLARGED THYROID: ICD-10-CM

## 2019-04-02 PROCEDURE — 76536 US EXAM OF HEAD AND NECK: CPT

## 2019-04-02 PROCEDURE — 77063 BREAST TOMOSYNTHESIS BI: CPT

## 2019-04-09 ENCOUNTER — OFFICE VISIT (OUTPATIENT)
Dept: ENT CLINIC | Age: 64
End: 2019-04-09
Payer: MEDICAID

## 2019-04-09 VITALS
WEIGHT: 194 LBS | BODY MASS INDEX: 36.63 KG/M2 | HEIGHT: 61 IN | DIASTOLIC BLOOD PRESSURE: 87 MMHG | SYSTOLIC BLOOD PRESSURE: 148 MMHG | HEART RATE: 90 BPM

## 2019-04-09 DIAGNOSIS — E04.9 GOITER: Primary | ICD-10-CM

## 2019-04-09 PROCEDURE — 1036F TOBACCO NON-USER: CPT | Performed by: OTOLARYNGOLOGY

## 2019-04-09 PROCEDURE — 3017F COLORECTAL CA SCREEN DOC REV: CPT | Performed by: OTOLARYNGOLOGY

## 2019-04-09 PROCEDURE — 99204 OFFICE O/P NEW MOD 45 MIN: CPT | Performed by: OTOLARYNGOLOGY

## 2019-04-09 PROCEDURE — G8427 DOCREV CUR MEDS BY ELIG CLIN: HCPCS | Performed by: OTOLARYNGOLOGY

## 2019-04-09 PROCEDURE — G8417 CALC BMI ABV UP PARAM F/U: HCPCS | Performed by: OTOLARYNGOLOGY

## 2019-04-09 ASSESSMENT — ENCOUNTER SYMPTOMS
SORE THROAT: 0
SINUS PAIN: 0
SINUS PRESSURE: 0
VOMITING: 0
BLOOD IN STOOL: 0
CHEST TIGHTNESS: 0
NAUSEA: 0
EYE PAIN: 0
CHOKING: 0
SHORTNESS OF BREATH: 0
STRIDOR: 0
COLOR CHANGE: 0
COUGH: 0
RHINORRHEA: 0
BACK PAIN: 0
CONSTIPATION: 0
DIARRHEA: 0
TROUBLE SWALLOWING: 0
EYE DISCHARGE: 0
APNEA: 0
WHEEZING: 0
VOICE CHANGE: 0
FACIAL SWELLING: 0

## 2019-04-09 NOTE — LETTER
[address] [facility]  [address]  [address] [address]  Phone: [phone]  Fax: [phone]    No ref. provider found        April 9, 2019       Patient: Leonel Mazariegos   MR Number: [id number]   YOB: 1955   Date of Visit: 4/9/2019       Dear Dr. Jennifer Sosa ref. provider found: Thank you for the request for consultation for Debbie Trinidad to me for the evaluation of goiter. Below are the relevant portions of my assessment and plan of care. If you have questions, please do not hesitate to call me. I look forward to following Pam along with you.     Sincerely,        Rubén Garzon MD    CC providers:  Pat Ribeiro MD  [address]

## 2019-04-09 NOTE — PROGRESS NOTES
Subjective:      Patient ID: Haja Villafuerte is a 60 y.o. female. HPI  History of Present Illness  Head/Neck    CC: goiter    Comments:  Patient with a many year history of neck fullness. Had MRI chest performed showing goiter. FNA performed. Normal thyroid cells. This was about 4-5 years ago. TSH normal. No breathing, swallowing or neck motion complaints. Neck ultrasound showing goiter with no nodules. Pain no  Location: Bilateral neck  Onset:  Probably over ten years. Otalgia:  no  Odynophagia:  no  Dysphagia:  none  Dyspnea:  no  Voice Changes:  No clear  Lumps in neck: Yes  Hemoptysis:  no  Fever: no  Chills:  no  Sweats:  no  Lethargy:  no  Weight Loss:  stable / unchanged  Modifying Factors:  Family history of thyroid disease Yes.  Mom had I131     Family history of thyroid cancer No     Personal history of neck radiation Yes  Patient denies the following symptoms: fatigue, weight gain, weight loss, cold intolerance, heat intolerance, hair loss, dry skin, constipation, diarrhea, edema, anxiety, tremor, palpitations and dysphagia    Patient Active Problem List   Diagnosis    Pre-diabetes    High cholesterol    Goiter    Iron deficiency anemia due to chronic blood loss    Essential hypertension     Past Surgical History:   Procedure Laterality Date    BREAST BIOPSY Left 2012    Calcifications    LAPAROSCOPY  1980    SHOULDER SURGERY Right 2016     Family History   Problem Relation Age of Onset    High Cholesterol Mother     Diabetes Mother     Thyroid Disease Mother     Anemia Mother     Dementia Mother     High Blood Pressure Father     Heart Failure Father     Stroke Father     Heart Disease Father     Heart Attack Father     Diabetes Sister     Heart Attack Sister 46    Arthritis Sister     Cervical Cancer Maternal Grandmother     No Known Problems Maternal Grandfather     No Known Problems Paternal Grandmother     Heart Attack Paternal Grandfather      Social History Socioeconomic History    Marital status:      Spouse name: Not on file    Number of children: Not on file    Years of education: Not on file    Highest education level: Not on file   Occupational History    Occupation: retired    Social Needs    Financial resource strain: Not on file    Food insecurity:     Worry: Not on file     Inability: Not on file    Transportation needs:     Medical: Not on file     Non-medical: Not on file   Tobacco Use    Smoking status: Never Smoker    Smokeless tobacco: Never Used   Substance and Sexual Activity    Alcohol use: No    Drug use: No    Sexual activity: Yes     Partners: Male   Lifestyle    Physical activity:     Days per week: Not on file     Minutes per session: Not on file    Stress: Not on file   Relationships    Social connections:     Talks on phone: Not on file     Gets together: Not on file     Attends Shinto service: Not on file     Active member of club or organization: Not on file     Attends meetings of clubs or organizations: Not on file     Relationship status: Not on file    Intimate partner violence:     Fear of current or ex partner: Not on file     Emotionally abused: Not on file     Physically abused: Not on file     Forced sexual activity: Not on file   Other Topics Concern    Not on file   Social History Narrative    Not on file     Review of Systems   Constitutional: Negative for activity change, appetite change, chills, fatigue and fever. HENT: Negative for congestion, dental problem, drooling, ear discharge, ear pain, facial swelling, hearing loss, mouth sores, nosebleeds, postnasal drip, rhinorrhea, sinus pressure, sinus pain, sneezing, sore throat, tinnitus, trouble swallowing and voice change. Eyes: Negative for pain, discharge and visual disturbance. Respiratory: Negative for apnea, cough, choking, chest tightness, shortness of breath, wheezing and stridor.     Cardiovascular: Negative for palpitations. Gastrointestinal: Negative for blood in stool, constipation, diarrhea, nausea and vomiting. Endocrine: Negative for cold intolerance, heat intolerance, polydipsia, polyphagia and polyuria. Musculoskeletal: Negative for back pain, gait problem, neck pain and neck stiffness. Skin: Negative for color change, pallor, rash and wound. Allergic/Immunologic: Negative for environmental allergies, food allergies and immunocompromised state. Neurological: Negative for dizziness, facial asymmetry, speech difficulty, light-headedness, numbness and headaches. Hematological: Negative for adenopathy. Does not bruise/bleed easily. Psychiatric/Behavioral: Negative for agitation, confusion, self-injury and sleep disturbance. The patient is not nervous/anxious. Objective:   Physical Exam   Constitutional: She is oriented to person, place, and time. She appears well-developed and well-nourished. HENT:   Head: Normocephalic and atraumatic. Not macrocephalic and not microcephalic. Head is without raccoon's eyes, without Frank's sign, without abrasion, without contusion, without laceration, without right periorbital erythema and without left periorbital erythema. Hair is normal.       Right Ear: Hearing, tympanic membrane and external ear normal. No drainage, swelling or tenderness. No mastoid tenderness. Tympanic membrane is not perforated, not retracted and not bulging. Tympanic membrane mobility is normal. No middle ear effusion. No decreased hearing is noted. Left Ear: Hearing, tympanic membrane and external ear normal. No drainage, swelling or tenderness. No mastoid tenderness. Tympanic membrane is not perforated, not retracted and not bulging. Tympanic membrane mobility is normal.  No middle ear effusion. No decreased hearing is noted. Nose: No mucosal edema, rhinorrhea, nose lacerations, sinus tenderness, nasal deformity, septal deviation or nasal septal hematoma. No epistaxis.   No foreign bodies. Right sinus exhibits no maxillary sinus tenderness and no frontal sinus tenderness. Left sinus exhibits no maxillary sinus tenderness and no frontal sinus tenderness. Mouth/Throat: Uvula is midline. Mucous membranes are not pale, not dry and not cyanotic. No oral lesions. No trismus in the jaw. Normal dentition. No dental abscesses, uvula swelling, lacerations or dental caries. No oropharyngeal exudate, posterior oropharyngeal edema, posterior oropharyngeal erythema or tonsillar abscesses. Eyes: Lids are normal. Lids are everted and swept, no foreign bodies found. Right eye exhibits no chemosis, no discharge and no exudate. Left eye exhibits no chemosis, no discharge and no exudate. Right eye exhibits normal extraocular motion and no nystagmus. Left eye exhibits normal extraocular motion and no nystagmus. Neck: Trachea normal. Neck supple. Normal carotid pulses present. No tracheal deviation present. No thyroid mass and no thyromegaly present. Cardiovascular: Normal rate and regular rhythm. Pulmonary/Chest: No stridor. No apnea, no tachypnea and no bradypnea. No respiratory distress. Musculoskeletal:        Right shoulder: She exhibits normal range of motion. Left shoulder: She exhibits normal range of motion. Lymphadenopathy:        Head (right side): No submental, no submandibular, no tonsillar, no preauricular, no posterior auricular and no occipital adenopathy present. Head (left side): No submental, no submandibular, no tonsillar, no preauricular, no posterior auricular and no occipital adenopathy present. Right cervical: No superficial cervical, no deep cervical and no posterior cervical adenopathy present. Left cervical: No superficial cervical, no deep cervical and no posterior cervical adenopathy present. Neurological: She is alert and oriented to person, place, and time. Skin: No bruising, no laceration and no lesion noted. No erythema.

## 2019-06-17 RX ORDER — METFORMIN HYDROCHLORIDE 500 MG/1
500 TABLET, EXTENDED RELEASE ORAL
Qty: 90 TABLET | Refills: 1 | Status: SHIPPED | OUTPATIENT
Start: 2019-06-17 | End: 2020-02-10

## 2019-08-10 DIAGNOSIS — I10 ESSENTIAL HYPERTENSION: ICD-10-CM

## 2019-08-12 RX ORDER — ATORVASTATIN CALCIUM 10 MG/1
TABLET, FILM COATED ORAL
Qty: 30 TABLET | Refills: 0 | Status: SHIPPED | OUTPATIENT
Start: 2019-08-12 | End: 2019-12-16 | Stop reason: SDUPTHER

## 2019-08-12 RX ORDER — AMLODIPINE BESYLATE 10 MG/1
TABLET ORAL
Qty: 30 TABLET | Refills: 3 | Status: SHIPPED | OUTPATIENT
Start: 2019-08-12 | End: 2019-11-12 | Stop reason: SDUPTHER

## 2019-08-20 ENCOUNTER — HOSPITAL ENCOUNTER (OUTPATIENT)
Dept: ULTRASOUND IMAGING | Age: 64
Discharge: HOME | End: 2019-08-20
Payer: MEDICAID

## 2019-08-20 DIAGNOSIS — D21.9 FIBROIDS: ICD-10-CM

## 2019-08-20 DIAGNOSIS — N93.9 ABNORMAL VAGINAL BLEEDING: ICD-10-CM

## 2019-08-20 PROCEDURE — 76830 TRANSVAGINAL US NON-OB: CPT

## 2019-08-20 PROCEDURE — 76856 US EXAM PELVIC COMPLETE: CPT

## 2019-09-06 ENCOUNTER — OFFICE VISIT (OUTPATIENT)
Dept: INTERNAL MEDICINE CLINIC | Age: 64
End: 2019-09-06
Payer: MEDICAID

## 2019-09-06 VITALS
HEART RATE: 87 BPM | SYSTOLIC BLOOD PRESSURE: 122 MMHG | RESPIRATION RATE: 12 BRPM | DIASTOLIC BLOOD PRESSURE: 78 MMHG | WEIGHT: 194 LBS | OXYGEN SATURATION: 98 % | BODY MASS INDEX: 36.42 KG/M2

## 2019-09-06 DIAGNOSIS — E04.9 GOITER: ICD-10-CM

## 2019-09-06 DIAGNOSIS — Z23 NEED FOR INFLUENZA VACCINATION: ICD-10-CM

## 2019-09-06 DIAGNOSIS — R73.03 PRE-DIABETES: Primary | ICD-10-CM

## 2019-09-06 DIAGNOSIS — I10 ESSENTIAL HYPERTENSION: ICD-10-CM

## 2019-09-06 DIAGNOSIS — D50.9 IRON DEFICIENCY ANEMIA, UNSPECIFIED IRON DEFICIENCY ANEMIA TYPE: ICD-10-CM

## 2019-09-06 DIAGNOSIS — E55.9 VITAMIN D DEFICIENCY: ICD-10-CM

## 2019-09-06 DIAGNOSIS — E53.8 B12 DEFICIENCY: ICD-10-CM

## 2019-09-06 DIAGNOSIS — E78.00 HIGH CHOLESTEROL: ICD-10-CM

## 2019-09-06 DIAGNOSIS — R42 VERTIGO: ICD-10-CM

## 2019-09-06 PROCEDURE — G8427 DOCREV CUR MEDS BY ELIG CLIN: HCPCS | Performed by: INTERNAL MEDICINE

## 2019-09-06 PROCEDURE — 99214 OFFICE O/P EST MOD 30 MIN: CPT | Performed by: INTERNAL MEDICINE

## 2019-09-06 PROCEDURE — 3017F COLORECTAL CA SCREEN DOC REV: CPT | Performed by: INTERNAL MEDICINE

## 2019-09-06 PROCEDURE — G8417 CALC BMI ABV UP PARAM F/U: HCPCS | Performed by: INTERNAL MEDICINE

## 2019-09-06 PROCEDURE — 90471 IMMUNIZATION ADMIN: CPT | Performed by: INTERNAL MEDICINE

## 2019-09-06 PROCEDURE — 90686 IIV4 VACC NO PRSV 0.5 ML IM: CPT | Performed by: INTERNAL MEDICINE

## 2019-09-06 PROCEDURE — 1036F TOBACCO NON-USER: CPT | Performed by: INTERNAL MEDICINE

## 2019-09-06 ASSESSMENT — ENCOUNTER SYMPTOMS
SHORTNESS OF BREATH: 0
DIARRHEA: 0
BACK PAIN: 0
PHOTOPHOBIA: 0
SINUS PAIN: 0
EYE PAIN: 0
TROUBLE SWALLOWING: 0
ABDOMINAL PAIN: 0
CONSTIPATION: 0
COUGH: 0
WHEEZING: 0
NAUSEA: 0
VOMITING: 0
COLOR CHANGE: 0
SINUS PRESSURE: 0

## 2019-09-06 ASSESSMENT — PATIENT HEALTH QUESTIONNAIRE - PHQ9
SUM OF ALL RESPONSES TO PHQ9 QUESTIONS 1 & 2: 0
SUM OF ALL RESPONSES TO PHQ QUESTIONS 1-9: 0
2. FEELING DOWN, DEPRESSED OR HOPELESS: 0
SUM OF ALL RESPONSES TO PHQ QUESTIONS 1-9: 0
1. LITTLE INTEREST OR PLEASURE IN DOING THINGS: 0

## 2019-09-06 NOTE — PROGRESS NOTES
(LIPITOR) 10 MG tablet TAKE 1 TABLET BY MOUTH ONCE DAILY . APPOINTMENT REQUIRED FOR FUTURE REFILLS 30 tablet 0    amLODIPine (NORVASC) 10 MG tablet TAKE 1 TABLET BY MOUTH ONCE DAILY 30 tablet 3    metFORMIN (GLUCOPHAGE-XR) 500 MG extended release tablet Take 1 tablet by mouth daily (with breakfast) 90 tablet 1    vitamin D 1000 units CAPS Take 1 capsule by mouth daily      Cyanocobalamin (VITAMIN B-12) 2500 MCG SUBL Place 2,500 mcg under the tongue daily      Multiple Vitamins-Minerals (CENTRUM SILVER 50+WOMEN PO) Take 1 tablet by mouth daily       No current facility-administered medications on file prior to visit. OBJECTIVE:  /78   Pulse 87   Resp 12   Wt 194 lb (88 kg)   LMP 02/15/2018   SpO2 98%   BMI 36.42 kg/m²   Wt Readings from Last 3 Encounters:   09/06/19 194 lb (88 kg)   04/09/19 194 lb (88 kg)   03/07/19 194 lb (88 kg)     Body mass index is 36.42 kg/m². BP Readings from Last 3 Encounters:   09/06/19 122/78   04/09/19 (!) 148/87   03/07/19 138/80     Pulse Readings from Last 3 Encounters:   09/06/19 87   04/09/19 90   03/07/19 83       Physical Exam   Constitutional: She is oriented to person, place, and time. She appears well-developed and well-nourished. No distress. HENT:   Head: Normocephalic and atraumatic. Right Ear: Tympanic membrane, external ear and ear canal normal.   Left Ear: Tympanic membrane, external ear and ear canal normal.   Mouth/Throat: Oropharynx is clear and moist. No oropharyngeal exudate. Negative [employer] debbie. Eyes: Pupils are equal, round, and reactive to light. EOM are normal. No scleral icterus. Neck: Normal range of motion. Neck supple. No JVD present. Thyromegaly (diffuse non tender goiter) present. No carotid bruits   Cardiovascular: Normal rate, regular rhythm, normal heart sounds and intact distal pulses. Exam reveals no gallop and no friction rub. No murmur heard.   Pulmonary/Chest: Effort normal and breath sounds normal. She

## 2019-11-12 DIAGNOSIS — I10 ESSENTIAL HYPERTENSION: ICD-10-CM

## 2019-11-12 RX ORDER — AMLODIPINE BESYLATE 10 MG/1
TABLET ORAL
Qty: 30 TABLET | Refills: 5 | Status: SHIPPED | OUTPATIENT
Start: 2019-11-12 | End: 2020-07-16

## 2019-12-16 RX ORDER — ATORVASTATIN CALCIUM 10 MG/1
TABLET, FILM COATED ORAL
Qty: 30 TABLET | Refills: 0 | Status: SHIPPED | OUTPATIENT
Start: 2019-12-16 | End: 2020-05-06

## 2020-02-10 RX ORDER — METFORMIN HYDROCHLORIDE 500 MG/1
TABLET, EXTENDED RELEASE ORAL
Qty: 90 TABLET | Refills: 0 | Status: SHIPPED | OUTPATIENT
Start: 2020-02-10 | End: 2020-06-06 | Stop reason: SDUPTHER

## 2020-06-10 ENCOUNTER — TELEPHONE (OUTPATIENT)
Dept: INTERNAL MEDICINE CLINIC | Age: 65
End: 2020-06-10

## 2020-06-30 ENCOUNTER — TELEMEDICINE (OUTPATIENT)
Dept: INTERNAL MEDICINE CLINIC | Age: 65
End: 2020-06-30
Payer: MEDICAID

## 2020-06-30 PROCEDURE — 99213 OFFICE O/P EST LOW 20 MIN: CPT | Performed by: INTERNAL MEDICINE

## 2020-06-30 RX ORDER — THIAMINE HCL 100 MG
500 TABLET ORAL DAILY
Qty: 30 TABLET | Refills: 0 | Status: SHIPPED
Start: 2020-06-30 | End: 2021-05-17

## 2020-06-30 NOTE — PROGRESS NOTES
Rob Coelho is a 63 y.o. female evaluated via telephone on 6/30/2020. Consent:  She and/or health care decision maker is aware that that she may receive a bill for this telephone service, depending on her insurance coverage, and has provided verbal consent to proceed: Yes      Documentation:  I communicated with the patient and/or health care decision maker about chronic medical conditions. Details of this discussion including any medical advice provided:     HTN - blood pressure has been good at home. Wants to start checking BP more often at home. Has been working in the yard but other wise has not been doing any dedicated exercise. Plan - continue amlodipine increase exercise    Prediabetes - taking metformin ER 500mg daily.   -Plan - continue metformin increase exercise    Goiter - No growth that she has noticed. No compressive symptoms or swallowing dysfunction. U/s 4/19 showed no nodules - heterogeneous appearance. Narrative   Thyroid ultrasound.       HISTORY: Enlarged thyroid.       The right lobe is enlarged and measures 11 x 5 x 2.5 cm       The left lobe is enlarged and measures 10 x 4 x 3 cm.       The isthmus is thickened and measures 1.7 cm       The gland is markedly heterogeneous in its echotexture which may relate to thyroiditis or goiter. No measurable nodules identified.           Impression       Enlarged diffusely heterogeneous thyroid indeterminate. Findings may relate to thyroiditis or goiter. Correlation with clinical history and physical examination , and laboratory values and follow-up recommended. Plan - monitor exam and TSH at next visit. Will not do yearly ultrasound at this point given no nodules to follow but with any change in status will consider repeat us. I affirm this is a Patient Initiated Episode with a Patient who has not had a related appointment within my department in the past 7 days or scheduled within the next 24 hours.     Patient identification

## 2020-10-19 ENCOUNTER — E-VISIT (OUTPATIENT)
Dept: INTERNAL MEDICINE CLINIC | Age: 65
End: 2020-10-19
Payer: MEDICAID

## 2020-10-19 PROCEDURE — 99421 OL DIG E/M SVC 5-10 MIN: CPT | Performed by: INTERNAL MEDICINE

## 2020-10-19 RX ORDER — BENZONATATE 100 MG/1
200 CAPSULE ORAL 3 TIMES DAILY PRN
Qty: 60 CAPSULE | Refills: 0 | Status: SHIPPED | OUTPATIENT
Start: 2020-10-19 | End: 2020-10-26

## 2020-10-19 ASSESSMENT — LIFESTYLE VARIABLES: SMOKING_STATUS: NO, I'VE NEVER SMOKED

## 2020-10-20 ENCOUNTER — TELEPHONE (OUTPATIENT)
Dept: INTERNAL MEDICINE CLINIC | Age: 65
End: 2020-10-20

## 2020-10-20 ENCOUNTER — OFFICE VISIT (OUTPATIENT)
Dept: PRIMARY CARE CLINIC | Age: 65
End: 2020-10-20
Payer: MEDICAID

## 2020-10-20 PROCEDURE — G8421 BMI NOT CALCULATED: HCPCS | Performed by: NURSE PRACTITIONER

## 2020-10-20 PROCEDURE — G8428 CUR MEDS NOT DOCUMENT: HCPCS | Performed by: NURSE PRACTITIONER

## 2020-10-20 PROCEDURE — 99211 OFF/OP EST MAY X REQ PHY/QHP: CPT | Performed by: NURSE PRACTITIONER

## 2020-10-20 NOTE — TELEPHONE ENCOUNTER
Left message on voicemail for return call. Per Dr. Claudette Saint, patient needs to be scheduled for COVID testing.  Please schedule at one of the testing sites when pt returns call

## 2020-10-20 NOTE — PROGRESS NOTES
Andra Reynaga received a viral test for COVID-19. They were educated on isolation and quarantine as appropriate. For any symptoms, they were directed to seek care from their PCP, given contact information to establish with a doctor, directed to an urgent care or the emergency room.

## 2020-10-23 LAB — SARS-COV-2, NAA: NOT DETECTED

## 2020-10-23 NOTE — RESULT ENCOUNTER NOTE

## 2020-11-10 ENCOUNTER — PATIENT MESSAGE (OUTPATIENT)
Dept: INTERNAL MEDICINE CLINIC | Age: 65
End: 2020-11-10

## 2020-11-10 NOTE — TELEPHONE ENCOUNTER
From: Drew Vital  To: Blanquita Zavala MD  Sent: 11/10/2020 11:19 AM EST  Subject: Non-Urgent [facility] Party Dr. Hu Seat! I have an appointment for a physical on the 16th. I was wondering if I could get my blood work done before the visit?   Claudia Vazquez

## 2020-11-11 DIAGNOSIS — E55.9 VITAMIN D DEFICIENCY: ICD-10-CM

## 2020-11-11 DIAGNOSIS — I10 ESSENTIAL HYPERTENSION: ICD-10-CM

## 2020-11-11 DIAGNOSIS — E04.9 GOITER: ICD-10-CM

## 2020-11-11 DIAGNOSIS — E78.00 HIGH CHOLESTEROL: ICD-10-CM

## 2020-11-11 DIAGNOSIS — E53.8 B12 DEFICIENCY: ICD-10-CM

## 2020-11-11 DIAGNOSIS — Z13.1 SCREENING FOR DIABETES MELLITUS (DM): ICD-10-CM

## 2020-11-11 DIAGNOSIS — D50.0 IRON DEFICIENCY ANEMIA DUE TO CHRONIC BLOOD LOSS: ICD-10-CM

## 2020-11-11 DIAGNOSIS — Z13.6 ENCOUNTER FOR LIPID SCREENING FOR CARDIOVASCULAR DISEASE: ICD-10-CM

## 2020-11-11 DIAGNOSIS — Z13.220 ENCOUNTER FOR LIPID SCREENING FOR CARDIOVASCULAR DISEASE: ICD-10-CM

## 2020-11-11 DIAGNOSIS — R73.03 PREDIABETES: ICD-10-CM

## 2020-11-11 DIAGNOSIS — Z00.00 ANNUAL PHYSICAL EXAM: ICD-10-CM

## 2020-11-11 LAB
A/G RATIO: 1.9 (ref 1.1–2.2)
ALBUMIN SERPL-MCNC: 4.3 G/DL (ref 3.4–5)
ALP BLD-CCNC: 109 U/L (ref 40–129)
ALT SERPL-CCNC: 23 U/L (ref 10–40)
ANION GAP SERPL CALCULATED.3IONS-SCNC: 10 MMOL/L (ref 3–16)
AST SERPL-CCNC: 17 U/L (ref 15–37)
BASOPHILS ABSOLUTE: 0 K/UL (ref 0–0.2)
BASOPHILS RELATIVE PERCENT: 0.8 %
BILIRUB SERPL-MCNC: 0.3 MG/DL (ref 0–1)
BUN BLDV-MCNC: 13 MG/DL (ref 7–20)
CALCIUM SERPL-MCNC: 9.4 MG/DL (ref 8.3–10.6)
CHLORIDE BLD-SCNC: 103 MMOL/L (ref 99–110)
CHOLESTEROL, TOTAL: 137 MG/DL (ref 0–199)
CO2: 28 MMOL/L (ref 21–32)
CREAT SERPL-MCNC: 0.7 MG/DL (ref 0.6–1.2)
EOSINOPHILS ABSOLUTE: 0.2 K/UL (ref 0–0.6)
EOSINOPHILS RELATIVE PERCENT: 3.1 %
GFR AFRICAN AMERICAN: >60
GFR NON-AFRICAN AMERICAN: >60
GLOBULIN: 2.3 G/DL
GLUCOSE BLD-MCNC: 122 MG/DL (ref 70–99)
HCT VFR BLD CALC: 38 % (ref 36–48)
HDLC SERPL-MCNC: 42 MG/DL (ref 40–60)
HEMOGLOBIN: 12.8 G/DL (ref 12–16)
LDL CHOLESTEROL CALCULATED: 70 MG/DL
LYMPHOCYTES ABSOLUTE: 1.4 K/UL (ref 1–5.1)
LYMPHOCYTES RELATIVE PERCENT: 27.4 %
MCH RBC QN AUTO: 28.9 PG (ref 26–34)
MCHC RBC AUTO-ENTMCNC: 33.6 G/DL (ref 31–36)
MCV RBC AUTO: 86.1 FL (ref 80–100)
MONOCYTES ABSOLUTE: 0.3 K/UL (ref 0–1.3)
MONOCYTES RELATIVE PERCENT: 6.4 %
NEUTROPHILS ABSOLUTE: 3.3 K/UL (ref 1.7–7.7)
NEUTROPHILS RELATIVE PERCENT: 62.3 %
PDW BLD-RTO: 14.6 % (ref 12.4–15.4)
PLATELET # BLD: 252 K/UL (ref 135–450)
PMV BLD AUTO: 8.8 FL (ref 5–10.5)
POTASSIUM SERPL-SCNC: 4.3 MMOL/L (ref 3.5–5.1)
RBC # BLD: 4.42 M/UL (ref 4–5.2)
SODIUM BLD-SCNC: 141 MMOL/L (ref 136–145)
TOTAL PROTEIN: 6.6 G/DL (ref 6.4–8.2)
TRIGL SERPL-MCNC: 123 MG/DL (ref 0–150)
TSH REFLEX: 0.66 UIU/ML (ref 0.27–4.2)
VITAMIN B-12: 1015 PG/ML (ref 211–911)
VITAMIN D 25-HYDROXY: 45.2 NG/ML
VLDLC SERPL CALC-MCNC: 25 MG/DL
WBC # BLD: 5.3 K/UL (ref 4–11)

## 2020-11-12 LAB
ESTIMATED AVERAGE GLUCOSE: 137 MG/DL
HBA1C MFR BLD: 6.4 %

## 2020-11-16 ENCOUNTER — OFFICE VISIT (OUTPATIENT)
Dept: INTERNAL MEDICINE CLINIC | Age: 65
End: 2020-11-16
Payer: MEDICAID

## 2020-11-16 VITALS
RESPIRATION RATE: 14 BRPM | WEIGHT: 201 LBS | HEART RATE: 96 BPM | BODY MASS INDEX: 37.95 KG/M2 | TEMPERATURE: 97 F | SYSTOLIC BLOOD PRESSURE: 126 MMHG | HEIGHT: 61 IN | OXYGEN SATURATION: 99 % | DIASTOLIC BLOOD PRESSURE: 64 MMHG

## 2020-11-16 PROBLEM — E66.01 MORBIDLY OBESE (HCC): Status: ACTIVE | Noted: 2020-11-16

## 2020-11-16 PROCEDURE — 90471 IMMUNIZATION ADMIN: CPT | Performed by: INTERNAL MEDICINE

## 2020-11-16 PROCEDURE — 90715 TDAP VACCINE 7 YRS/> IM: CPT | Performed by: INTERNAL MEDICINE

## 2020-11-16 PROCEDURE — G8482 FLU IMMUNIZE ORDER/ADMIN: HCPCS | Performed by: INTERNAL MEDICINE

## 2020-11-16 PROCEDURE — 99396 PREV VISIT EST AGE 40-64: CPT | Performed by: INTERNAL MEDICINE

## 2020-11-16 RX ORDER — OMEPRAZOLE 20 MG/1
20 CAPSULE, DELAYED RELEASE ORAL
Qty: 60 CAPSULE | Refills: 0 | Status: SHIPPED | OUTPATIENT
Start: 2020-11-16 | End: 2021-05-17

## 2020-11-16 ASSESSMENT — PATIENT HEALTH QUESTIONNAIRE - PHQ9
SUM OF ALL RESPONSES TO PHQ QUESTIONS 1-9: 0
SUM OF ALL RESPONSES TO PHQ9 QUESTIONS 1 & 2: 0
1. LITTLE INTEREST OR PLEASURE IN DOING THINGS: 0
2. FEELING DOWN, DEPRESSED OR HOPELESS: 0

## 2020-11-16 NOTE — PROGRESS NOTES
History and Physical      Xochitl Pelaez  YOB: 1955    Date of Service:  11/16/2020    Vitals:    11/16/20 0858   BP: 126/64   Pulse: 96   Resp: 14   Temp: 97 °F (36.1 °C)   TempSrc: Temporal   SpO2: 99%   Weight: 201 lb (91.2 kg)   Height: 5' 1.2\" (1.554 m)     Body mass index is 37.73 kg/m². Wt Readings from Last 3 Encounters:   11/16/20 201 lb (91.2 kg)   09/06/19 194 lb (88 kg)   04/09/19 194 lb (88 kg)     BP Readings from Last 3 Encounters:   11/16/20 126/64   09/06/19 122/78   04/09/19 (!) 148/87        Chief Complaint: Xochitl Pelaez is a 63 y.o. female who presents for complete physical examination. HPI:     Overall doing well, feeling well. Not exercising. Not watching diet. Taking meds as rx'ed.      Patient Active Problem List   Diagnosis    Pre-diabetes    High cholesterol    Goiter    Iron deficiency anemia due to chronic blood loss    Essential hypertension    Morbidly obese (HCC)       No Known Allergies     Outpatient Medications Marked as Taking for the 11/16/20 encounter (Office Visit) with Diana Tim MD   Medication Sig Dispense Refill    omeprazole (PRILOSEC) 20 MG delayed release capsule Take 1 capsule by mouth every morning (before breakfast) 60 capsule 0    amLODIPine (NORVASC) 10 MG tablet Take 1 tablet by mouth once daily 90 tablet 1    Cyanocobalamin (VITAMIN B-12) 2500 MCG SUBL Place 0.5 tablets under the tongue daily 30 tablet 0    metFORMIN (GLUCOPHAGE) 500 MG tablet Take 1 tablet by mouth daily (with breakfast) 30 tablet 0    atorvastatin (LIPITOR) 10 MG tablet Take 1 tablet by mouth once daily 30 tablet 5    vitamin D 1000 units CAPS Take 1 capsule by mouth daily      Multiple Vitamins-Minerals (CENTRUM SILVER 50+WOMEN PO) Take 1 tablet by mouth daily         Past Medical History:   Diagnosis Date    Fibroid     Goiter     High cholesterol     Hypertension     Pre-diabetes      Past Surgical History:   Procedure Laterality Date    BREAST BIOPSY Left 2012    Calcifications    LAPAROSCOPY  1980    SHOULDER SURGERY Right 2016     Family History   Problem Relation Age of Onset    High Cholesterol Mother     Diabetes Mother     Thyroid Disease Mother     Anemia Mother     Dementia Mother     High Blood Pressure Father     Heart Failure Father     Stroke Father     Heart Disease Father     Heart Attack Father     Diabetes Sister     Heart Attack Sister 46    Arthritis Sister     Cervical Cancer Maternal Grandmother     No Known Problems Maternal Grandfather     No Known Problems Paternal Grandmother     Heart Attack Paternal Grandfather        Review of Systems:  A comprehensive review of systems was negative except for what was noted in the HPI. Physical Exam  Constitutional:       General: She is not in acute distress. Appearance: Normal appearance. She is well-developed. She is not diaphoretic. HENT:      Head: Normocephalic and atraumatic. Right Ear: Tympanic membrane, ear canal and external ear normal.      Left Ear: Tympanic membrane, ear canal and external ear normal.      Nose: Nose normal. No congestion or rhinorrhea. Mouth/Throat:      Mouth: Mucous membranes are moist.      Pharynx: Oropharynx is clear. Posterior oropharyngeal erythema (mild) present. No oropharyngeal exudate. Eyes:      General: No scleral icterus. Conjunctiva/sclera: Conjunctivae normal.      Pupils: Pupils are equal, round, and reactive to light. Neck:      Musculoskeletal: Normal range of motion and neck supple. Thyroid: No thyromegaly. Vascular: No carotid bruit or JVD. Comments: No JVD at 90 deg  + thyromegaly. No definite nodules  Cardiovascular:      Rate and Rhythm: Normal rate and regular rhythm. Pulses: Normal pulses. Heart sounds: Normal heart sounds. No murmur. No friction rub. No gallop. Pulmonary:      Effort: Pulmonary effort is normal.      Breath sounds: Normal breath sounds.  No wheezing or rales. Abdominal:      General: Abdomen is flat. Bowel sounds are normal. There is no distension. Palpations: Abdomen is soft. Tenderness: There is no right CVA tenderness or left CVA tenderness. Musculoskeletal: Normal range of motion. General: No tenderness or deformity. Right lower leg: No edema. Left lower leg: No edema. Lymphadenopathy:      Cervical: No cervical adenopathy. Skin:     General: Skin is warm and dry. Findings: No lesion or rash. Neurological:      General: No focal deficit present. Mental Status: She is alert and oriented to person, place, and time. Cranial Nerves: No cranial nerve deficit (grossly intact). Sensory: No sensory deficit. Motor: No weakness. Coordination: Coordination normal.      Gait: Gait normal.   Psychiatric:         Mood and Affect: Mood normal.         Behavior: Behavior normal.          Lipid panel:  Lab Results   Component Value Date    CHOL 137 11/11/2020    TRIG 123 11/11/2020    HDL 42 11/11/2020    LDLCALC 70 11/11/2020     Glucose:   Glucose (mg/dL)   Date Value   11/11/2020 122 (H)       No results found for this visit on 11/16/20.      Preventive Care:  Hx abnormal PAP: no  Self-breast exams: yes  Hx of abnormal mammogram: no  Previous DEXA scan: no   Exercise: no regular exercise  Social History     Socioeconomic History    Marital status:      Spouse name: None    Number of children: None    Years of education: None    Highest education level: None   Occupational History    Occupation: retired    Social Needs    Financial resource strain: None    Food insecurity     Worry: None     Inability: None    Transportation needs     Medical: None     Non-medical: None   Tobacco Use    Smoking status: Never Smoker    Smokeless tobacco: Never Used   Substance and Sexual Activity    Alcohol use: No    Drug use: No    Sexual activity: Yes     Partners: Male Lifestyle    Physical activity     Days per week: None     Minutes per session: None    Stress: None   Relationships    Social connections     Talks on phone: None     Gets together: None     Attends Worship service: None     Active member of club or organization: None     Attends meetings of clubs or organizations: None     Relationship status: None    Intimate partner violence     Fear of current or ex partner: None     Emotionally abused: None     Physically abused: None     Forced sexual activity: None   Other Topics Concern    None   Social History Narrative    None     Social History     Substance and Sexual Activity   Sexual Activity Yes    Partners: Male       Advance Directive: N, <no information>    Immunization History   Administered Date(s) Administered    Influenza Vaccine, unspecified formulation 09/23/2010, 10/02/2012    Influenza Virus Vaccine 09/26/2020    Influenza, Tracey Pulse, IM, PF (6 mo and older Fluzone, Flulaval, Fluarix, and 3 yrs and older Afluria) 10/27/2017, 10/04/2018, 09/06/2019, 09/26/2020    MMR 08/18/2003    PPD Test 01/23/2012, 04/09/2014, 09/14/2015    Tdap (Boostrix, Adacel) 09/23/2010       Health Maintenance   Topic Date Due    Shingles Vaccine (1 of 2) 04/04/2007    DTaP/Tdap/Td vaccine (2 - Td) 09/23/2020    Breast cancer screen  04/02/2021    A1C test (Diabetic or Prediabetic)  11/11/2021    Lipid screen  11/11/2021    Potassium monitoring  11/11/2021    Creatinine monitoring  11/11/2021    Colon cancer screen colonoscopy  05/19/2022    Cervical cancer screen  07/20/2023    Flu vaccine  Completed    Hepatitis C screen  Completed    HIV screen  Completed    Hepatitis A vaccine  Aged Out    Hepatitis B vaccine  Aged Out    Hib vaccine  Aged Out    Meningococcal (ACWY) vaccine  Aged Out    Pneumococcal 0-64 years Vaccine  Aged Out          Assessment/Plan:   1. Annual physical exam  Increase exercise. Work on [employer]   Needs to lose some weight

## 2020-11-17 DIAGNOSIS — D50.0 IRON DEFICIENCY ANEMIA DUE TO CHRONIC BLOOD LOSS: ICD-10-CM

## 2020-11-17 DIAGNOSIS — E04.0 DIFFUSE GOITER: ICD-10-CM

## 2020-11-17 LAB
FERRITIN: 105.3 NG/ML (ref 15–150)
THYROID PEROXIDASE (TPO) ABS: 10 IU/ML

## 2020-12-28 NOTE — TELEPHONE ENCOUNTER
Last appointment: 11/16/2020  Next appointment: 5/17/2021  Last refill: 06/10/2020 # 30 with no refills

## 2020-12-29 RX ORDER — ATORVASTATIN CALCIUM 10 MG/1
10 TABLET, FILM COATED ORAL DAILY
Qty: 30 TABLET | Refills: 5 | Status: SHIPPED | OUTPATIENT
Start: 2020-12-29 | End: 2021-07-26

## 2020-12-29 RX ORDER — AMLODIPINE BESYLATE 10 MG/1
10 TABLET ORAL DAILY
Qty: 90 TABLET | Refills: 1 | Status: SHIPPED | OUTPATIENT
Start: 2020-12-29 | End: 2021-07-26

## 2021-01-29 ENCOUNTER — TELEPHONE (OUTPATIENT)
Dept: INTERNAL MEDICINE CLINIC | Age: 66
End: 2021-01-29

## 2021-01-29 NOTE — TELEPHONE ENCOUNTER
Pt states she felt a \"pop\" while coming up basement steps on Tuesday. Knee pain and swelling. Pt needs to use a cane to get around.  Pt referred to [address] [place] [address] [facility] per Dr Yanet Simmons

## 2021-01-29 NOTE — TELEPHONE ENCOUNTER
Patient hurt her right  knee while coming up basement steps carrying a basket on Tuesday night. It is a bit swollen and tender to the touch. She has been having trouble walking and standing and has been using a cane. Please call her at number provided to discuss best option for treating. Thanks.

## 2021-03-24 ENCOUNTER — HOSPITAL ENCOUNTER (OUTPATIENT)
Dept: MAMMOGRAPHY | Age: 66
Discharge: HOME | End: 2021-03-24
Payer: MEDICAID

## 2021-03-24 DIAGNOSIS — Z12.31 ENCOUNTER FOR SCREENING MAMMOGRAM FOR MALIGNANT NEOPLASM OF BREAST: ICD-10-CM

## 2021-03-24 PROCEDURE — 77067 SCR MAMMO BI INCL CAD: CPT

## 2021-05-17 ENCOUNTER — OFFICE VISIT (OUTPATIENT)
Dept: INTERNAL MEDICINE CLINIC | Age: 66
End: 2021-05-17
Payer: MEDICAID

## 2021-05-17 VITALS
DIASTOLIC BLOOD PRESSURE: 62 MMHG | SYSTOLIC BLOOD PRESSURE: 124 MMHG | WEIGHT: 205 LBS | HEART RATE: 77 BPM | RESPIRATION RATE: 14 BRPM | BODY MASS INDEX: 38.48 KG/M2 | OXYGEN SATURATION: 97 %

## 2021-05-17 DIAGNOSIS — I10 ESSENTIAL HYPERTENSION: Primary | ICD-10-CM

## 2021-05-17 DIAGNOSIS — G89.29 CHRONIC PAIN OF RIGHT KNEE: ICD-10-CM

## 2021-05-17 DIAGNOSIS — N95.0 POSTMENOPAUSAL BLEEDING: ICD-10-CM

## 2021-05-17 DIAGNOSIS — M25.561 CHRONIC PAIN OF RIGHT KNEE: ICD-10-CM

## 2021-05-17 PROCEDURE — 1036F TOBACCO NON-USER: CPT | Performed by: INTERNAL MEDICINE

## 2021-05-17 PROCEDURE — 99214 OFFICE O/P EST MOD 30 MIN: CPT | Performed by: INTERNAL MEDICINE

## 2021-05-17 PROCEDURE — G8427 DOCREV CUR MEDS BY ELIG CLIN: HCPCS | Performed by: INTERNAL MEDICINE

## 2021-05-17 PROCEDURE — 3017F COLORECTAL CA SCREEN DOC REV: CPT | Performed by: INTERNAL MEDICINE

## 2021-05-17 PROCEDURE — G8417 CALC BMI ABV UP PARAM F/U: HCPCS | Performed by: INTERNAL MEDICINE

## 2021-05-17 ASSESSMENT — PATIENT HEALTH QUESTIONNAIRE - PHQ9
1. LITTLE INTEREST OR PLEASURE IN DOING THINGS: 0
2. FEELING DOWN, DEPRESSED OR HOPELESS: 0
SUM OF ALL RESPONSES TO PHQ QUESTIONS 1-9: 0
SUM OF ALL RESPONSES TO PHQ9 QUESTIONS 1 & 2: 0
SUM OF ALL RESPONSES TO PHQ QUESTIONS 1-9: 0
SUM OF ALL RESPONSES TO PHQ QUESTIONS 1-9: 0

## 2021-05-17 NOTE — PROGRESS NOTES
Israel Nicole (:  1955) is a 61 y.o. female,Established patient, here for evaluation of the following chief complaint(s):  Follow-up      ASSESSMENT/PLAN:  1. Essential hypertension  Assessment & Plan:   At goal, continue current medications, medication adherence emphasized and lifestyle modifications recommended  2. Postmenopausal bleeding  Comments:  Now firmly postmenopausal. Needs work up. Will get pelvic ultrasound and refer to gyn for EMBx. Orders:  -     US NON OB TRANSVAGINAL; Future  -     US PELVIS COMPLETE; Future  -     TSH with Reflex; Future  -     CBC Auto Differential; Future  -     COMPREHENSIVE METABOLIC PANEL; Future  -     FERRITIN; Future  -     Iron and TIBC; Future  -     URINALYSIS WITH MICROSCOPIC  3. Chronic pain of right knee  Comments:  After injury. May have some underlying arthritis. XR for eval. For now relative rest, topicals, tylenol, ice. Consider ortho referral.   Orders:  -     XR KNEE RIGHT (3 VIEWS); Future  -     diclofenac sodium (VOLTAREN) 1 % GEL; Apply topically 2 times daily, Topical, 2 TIMES DAILY Starting Mon 2021, Disp-100 g, R-5, Normal      Return in about 6 months (around 2021) for Physical. Schedule with Dr. Mable Hammans for postmenopausal bleeding, . SUBJECTIVE/OBJECTIVE:  HPI    Taking BP meds as rx'ed. With urinating has noticed a little blood streaking when wiping across the vagina. She is almost positive it is coming from the vagina. Notices about once a month. Has noticed over the last 2 months. Will happen two days in a row. Has a few random cramps on the left side as well. LMP at age 60  Saw Dr. Mable Hammans for irregular bleeding patters prior to menopause in 2019.       19 pelvic ultrasound  Narrative   PELVIC ULTRASOUND WITH TRANSVAGINAL IMAGING       HISTORY: Abnormal vaginal bleeding       COMPARISON: 2019.       FINDINGS:       The uterus measures 8.5 x 5.9 x 5.3 cm.       Multiple fibroids are seen.  This includes a posterior fibroid measuring 3.7 x 3.5 x 3.9 cm. This is slightly decreased from prior study. There is a 2.9 x 2.1 x 2.6 cm fundal fibroid which is essentially stable from prior study.       Additional anterior fibroid measuring up to 1.2 cm is also noted.       The ovaries cannot be identified due to bowel gas.       No gross abnormal adnexal mass or fluid collection was seen.       Endometrial stripe is 6 mm.           Impression       Multiple fibroids which appear essentially stable from prior study.       6 mm endometrial stripe thickness.       Ovaries not identified. Right knee pain - injury in February. Stepped up onto basement step and right knee \"popped\"  Lots of pain. Had to walk with a cane. Pain is anterior. No swelling or bruising. No definite clicking or locking. Going down steps would get some giveway. 80% improvement. Able to walk. Knee aches at night. She sleeps in her side. Right ankle swells as well. Review of Systems   Constitutional: Negative for chills, fatigue and fever. Respiratory: Negative for cough, shortness of breath and wheezing. Cardiovascular: Negative for chest pain, palpitations and leg swelling. Gastrointestinal: Negative for abdominal pain, constipation, diarrhea, nausea and vomiting. Genitourinary: Positive for vaginal bleeding. Musculoskeletal: Positive for arthralgias. Psychiatric/Behavioral: Negative for dysphoric mood.        Current Outpatient Medications on File Prior to Visit   Medication Sig Dispense Refill    atorvastatin (LIPITOR) 10 MG tablet Take 1 tablet by mouth daily 30 tablet 5    metFORMIN (GLUCOPHAGE) 500 MG tablet Take 1 tablet by mouth daily (with breakfast) 30 tablet 5    amLODIPine (NORVASC) 10 MG tablet Take 1 tablet by mouth daily 90 tablet 1    vitamin D 1000 units CAPS Take 1 capsule by mouth daily      Multiple Vitamins-Minerals (CENTRUM SILVER 50+WOMEN PO) Take 1 tablet by mouth daily      Cyanocobalamin (VITAMIN B-12) 2500 MCG SUBL Place 0.5 tablets under the tongue daily (Patient not taking: Reported on 5/17/2021) 30 tablet 0     No current facility-administered medications on file prior to visit. Vitals:    05/17/21 0939   BP: 124/62   Pulse: 77   Resp: 14   SpO2: 97%     Physical Exam  Constitutional:       General: She is not in acute distress. Appearance: Normal appearance. She is not diaphoretic. HENT:      Head: Normocephalic and atraumatic. Right Ear: External ear normal.      Left Ear: External ear normal.      Nose: Nose normal.      Mouth/Throat:      Mouth: Mucous membranes are moist.      Pharynx: Oropharynx is clear. Eyes:      General: No scleral icterus. Conjunctiva/sclera: Conjunctivae normal.   Cardiovascular:      Rate and Rhythm: Normal rate and regular rhythm. Pulses: Normal pulses. Heart sounds: Normal heart sounds. No murmur heard. No friction rub. No gallop. Pulmonary:      Effort: Pulmonary effort is normal.      Breath sounds: Normal breath sounds. No wheezing, rhonchi or rales. Abdominal:      General: Abdomen is flat. Bowel sounds are normal.   Musculoskeletal:         General: No deformity. Cervical back: Normal range of motion. Right lower leg: No edema. Left lower leg: No edema. Skin:     General: Skin is warm and dry. Findings: No rash. Neurological:      General: No focal deficit present. Mental Status: She is alert. Coordination: Coordination normal.      Gait: Gait normal.   Psychiatric:         Mood and Affect: Mood normal.         Behavior: Behavior normal.                 An electronic signature was used to authenticate this note.     --Reuben Sarmiento MD

## 2021-05-18 ENCOUNTER — TELEPHONE (OUTPATIENT)
Dept: GYNECOLOGY | Age: 66
End: 2021-05-18

## 2021-05-18 LAB
BACTERIA: ABNORMAL /HPF
BILIRUBIN URINE: NEGATIVE
BLOOD, URINE: NEGATIVE
CLARITY: CLEAR
COLOR: YELLOW
EPITHELIAL CELLS, UA: 1 /HPF (ref 0–5)
GLUCOSE URINE: NEGATIVE MG/DL
HYALINE CASTS: 1 /LPF (ref 0–8)
KETONES, URINE: NEGATIVE MG/DL
LEUKOCYTE ESTERASE, URINE: NEGATIVE
MICROSCOPIC EXAMINATION: ABNORMAL
NITRITE, URINE: NEGATIVE
PH UA: 6 (ref 5–8)
PROTEIN UA: NEGATIVE MG/DL
RBC UA: 3 /HPF (ref 0–4)
SPECIFIC GRAVITY UA: 1.02 (ref 1–1.03)
URINE TYPE: ABNORMAL
UROBILINOGEN, URINE: 0.2 E.U./DL
WBC UA: 0 /HPF (ref 0–5)

## 2021-05-18 NOTE — TELEPHONE ENCOUNTER
New patient referred by Dr Meagan Palomino for Post menopausal bleeding. Please review and advise when she should be scheduled. Patient can be reached at [phone]    Says she has a pelvic us scheduled for May 26th.

## 2021-05-20 NOTE — TELEPHONE ENCOUNTER
Spoke with patient scheduled her 6/1/21 at 200, she will have had her pelvic US already done, please review and advise if this date and time are ok.

## 2021-05-26 ENCOUNTER — HOSPITAL ENCOUNTER (OUTPATIENT)
Dept: GENERAL RADIOLOGY | Age: 66
Discharge: HOME | End: 2021-05-26
Payer: MEDICAID

## 2021-05-26 ENCOUNTER — HOSPITAL ENCOUNTER (OUTPATIENT)
Dept: ULTRASOUND IMAGING | Age: 66
Discharge: HOME | End: 2021-05-26
Payer: MEDICAID

## 2021-05-26 DIAGNOSIS — G89.29 CHRONIC PAIN OF RIGHT KNEE: ICD-10-CM

## 2021-05-26 DIAGNOSIS — M25.561 CHRONIC PAIN OF RIGHT KNEE: ICD-10-CM

## 2021-05-26 DIAGNOSIS — N95.0 POSTMENOPAUSAL BLEEDING: ICD-10-CM

## 2021-05-26 PROCEDURE — 76830 TRANSVAGINAL US NON-OB: CPT

## 2021-05-26 PROCEDURE — 76856 US EXAM PELVIC COMPLETE: CPT

## 2021-05-26 PROCEDURE — 73562 X-RAY EXAM OF KNEE 3: CPT

## 2021-05-27 ENCOUNTER — TELEPHONE (OUTPATIENT)
Dept: INTERNAL MEDICINE CLINIC | Age: 66
End: 2021-05-27

## 2021-05-27 DIAGNOSIS — M25.561 CHRONIC PAIN OF RIGHT KNEE: Primary | ICD-10-CM

## 2021-05-27 DIAGNOSIS — G89.29 CHRONIC PAIN OF RIGHT KNEE: Primary | ICD-10-CM

## 2021-05-27 NOTE — TELEPHONE ENCOUNTER
Talked to patient on phone re: results. Thickened endometrial stripe. Has appt with Dr. Aguila Going on 6/1    Wants to see ortho about knee.    Sent in referral.

## 2021-06-01 ENCOUNTER — OFFICE VISIT (OUTPATIENT)
Dept: GYNECOLOGY | Age: 66
End: 2021-06-01
Payer: MEDICAID

## 2021-06-01 VITALS
BODY MASS INDEX: 38.71 KG/M2 | WEIGHT: 205 LBS | HEIGHT: 61 IN | RESPIRATION RATE: 17 BRPM | HEART RATE: 95 BPM | SYSTOLIC BLOOD PRESSURE: 122 MMHG | DIASTOLIC BLOOD PRESSURE: 73 MMHG

## 2021-06-01 DIAGNOSIS — N95.0 POST-MENOPAUSAL BLEEDING: ICD-10-CM

## 2021-06-01 DIAGNOSIS — Z01.419 WELL WOMAN EXAM WITH ROUTINE GYNECOLOGICAL EXAM: Primary | ICD-10-CM

## 2021-06-01 PROCEDURE — 99396 PREV VISIT EST AGE 40-64: CPT | Performed by: OBSTETRICS & GYNECOLOGY

## 2021-06-01 PROCEDURE — 58100 BIOPSY OF UTERUS LINING: CPT | Performed by: OBSTETRICS & GYNECOLOGY

## 2021-06-02 ASSESSMENT — ENCOUNTER SYMPTOMS
COUGH: 0
WHEEZING: 0
ABDOMINAL PAIN: 0
NAUSEA: 0
DIARRHEA: 0
SHORTNESS OF BREATH: 0
VOMITING: 0
CONSTIPATION: 0

## 2021-06-05 ASSESSMENT — ENCOUNTER SYMPTOMS
EYES NEGATIVE: 1
RESPIRATORY NEGATIVE: 1
GASTROINTESTINAL NEGATIVE: 1

## 2021-06-05 NOTE — PROGRESS NOTES
Subjective:      Patient ID: Nolvia Elias is a 61 y.o. female. Patient is here for annual. Patient had some PMB. Patient stripe 9 mm, had fibroids. Gynecologic Exam    Vaginal Bleeding        Review of Systems   Constitutional: Negative. HENT: Negative. Eyes: Negative. Respiratory: Negative. Cardiovascular: Negative. Gastrointestinal: Negative. Genitourinary: Positive for vaginal bleeding. Musculoskeletal: Negative. Skin: Negative. Neurological: Negative. Psychiatric/Behavioral: Negative.       Date of Birth 1955  Past Medical History:   Diagnosis Date    Fibroid     Goiter     High cholesterol     Hypertension     Infertility, female     Irregular uterine bleeding     Menopause ovarian failure     Menorrhagia     Polycystic ovary syndrome     Pre-diabetes      Past Surgical History:   Procedure Laterality Date    BREAST BIOPSY Left 2012    Calcifications    LAPAROSCOPY  1980    SHOULDER SURGERY Right 2016     OB History    Para Term  AB Living   0 0 0 0 0 0   SAB TAB Ectopic Molar Multiple Live Births   0 0 0 0 0 0   Obstetric Comments   Menarche @ 17years of age     Social History     Socioeconomic History    Marital status:      Spouse name: Not on file    Number of children: Not on file    Years of education: Not on file    Highest education level: Not on file   Occupational History    Occupation: retired    Tobacco Use    Smoking status: Never Smoker    Smokeless tobacco: Never Used   Vaping Use    Vaping Use: Never used   Substance and Sexual Activity    Alcohol use: No    Drug use: No    Sexual activity: Yes     Partners: Male   Other Topics Concern    Not on file   Social History Narrative    Not on file     Social Determinants of Health     Financial Resource Strain:     Difficulty of Paying Living Expenses:    Food Insecurity:     Worried About Running Out of Food in the Last Year:     Mehrdad faye Food in the Last Year:    Transportation Needs:     Lack of Transportation (Medical):      Lack of Transportation (Non-Medical):    Physical Activity:     Days of Exercise per Week:     Minutes of Exercise per Session:    Stress:     Feeling of Stress :    Social Connections:     Frequency of Communication with Friends and Family:     Frequency of Social Gatherings with Friends and Family:     Attends Anabaptism Services:     Active Member of Clubs or Organizations:     Attends Club or Organization Meetings:     Marital Status:    Intimate Partner Violence:     Fear of Current or Ex-Partner:     Emotionally Abused:     Physically Abused:     Sexually Abused:      No Known Allergies  Outpatient Medications Marked as Taking for the 6/1/21 encounter (Office Visit) with Lala Caba MD   Medication Sig Dispense Refill    diclofenac sodium (VOLTAREN) 1 % GEL Apply topically 2 times daily 100 g 5    atorvastatin (LIPITOR) 10 MG tablet Take 1 tablet by mouth daily 30 tablet 5    metFORMIN (GLUCOPHAGE) 500 MG tablet Take 1 tablet by mouth daily (with breakfast) 30 tablet 5    amLODIPine (NORVASC) 10 MG tablet Take 1 tablet by mouth daily 90 tablet 1    vitamin D 1000 units CAPS Take 1 capsule by mouth daily      Multiple Vitamins-Minerals (CENTRUM SILVER 50+WOMEN PO) Take 1 tablet by mouth daily       Family History   Problem Relation Age of Onset    High Cholesterol Mother     Diabetes Mother     Thyroid Disease Mother     Anemia Mother     Dementia Mother     High Blood Pressure Father     Heart Failure Father     Stroke Father     Heart Disease Father     Heart Attack Father     Diabetes Sister     Heart Attack Sister 46    Arthritis Sister     Cervical Cancer Maternal Grandmother     No Known Problems Maternal Grandfather     No Known Problems Paternal Grandmother     Heart Attack Paternal Grandfather      /73 (Site: Right Upper Arm, Position: Sitting, Cuff Size: Large Adult)   Pulse 95   Resp 17   Ht 5' 1\" (1.549 m)   Wt 205 lb (93 kg)   LMP 02/15/2018   BMI 38.73 kg/m²       Objective:   Physical Exam  Constitutional:       General: She is not in acute distress. Appearance: Normal appearance. She is well-developed and normal weight. She is not diaphoretic. HENT:      Head: Normocephalic and atraumatic. Nose: Nose normal.      Mouth/Throat:      Mouth: Mucous membranes are moist.      Pharynx: Oropharynx is clear. Eyes:      Pupils: Pupils are equal, round, and reactive to light. Neck:      Thyroid: No thyromegaly. Cardiovascular:      Rate and Rhythm: Normal rate and regular rhythm. Heart sounds: Normal heart sounds. No murmur heard. No friction rub. No gallop. Pulmonary:      Effort: Pulmonary effort is normal. No respiratory distress. Breath sounds: Normal breath sounds. No wheezing or rales. Chest:      Breasts:         Right: Normal. No swelling, bleeding, inverted nipple, mass, nipple discharge, skin change or tenderness. Left: Normal. No swelling, bleeding, inverted nipple, mass, nipple discharge, skin change or tenderness. Abdominal:      General: Abdomen is flat. Bowel sounds are normal. There is no distension. Palpations: Abdomen is soft. There is no hepatomegaly or mass. Tenderness: There is no abdominal tenderness. There is no guarding or rebound. Hernia: No hernia is present. There is no hernia in the left inguinal area. Genitourinary:     General: Normal vulva. Exam position: Lithotomy position. Pubic Area: No rash. Labia:         Right: No rash, tenderness, lesion or injury. Left: No rash, tenderness, lesion or injury. Urethra: No prolapse, urethral pain, urethral swelling or urethral lesion. Vagina: Normal. No signs of injury and foreign body. No vaginal discharge, erythema, tenderness or bleeding.       Cervix: No cervical motion tenderness, discharge, friability, lesion, erythema, cervical bleeding or eversion. Uterus: Not deviated, not enlarged, not fixed, not tender and no uterine prolapse. Adnexa:         Right: No mass, tenderness or fullness. Left: No mass, tenderness or fullness. Rectum: Normal. Guaiac result negative. No mass, tenderness, anal fissure, external hemorrhoid or internal hemorrhoid. Normal anal tone. Comments: Normal urethral meatus, nl urethra, nl bladder. Musculoskeletal:         General: No tenderness. Normal range of motion. Cervical back: Normal range of motion and neck supple. No rigidity. Lymphadenopathy:      Cervical: No cervical adenopathy. Lower Body: No right inguinal adenopathy. No left inguinal adenopathy. Skin:     General: Skin is warm and dry. Findings: No erythema or rash. Neurological:      General: No focal deficit present. Mental Status: She is alert and oriented to person, place, and time. Deep Tendon Reflexes: Reflexes are normal and symmetric. Psychiatric:         Mood and Affect: Mood normal.         Behavior: Behavior normal.         Thought Content: Thought content normal.         Judgment: Judgment normal.         Assessment:      1. Annual  2. PMB  3. Fibroids  4. menopause      Plan:      1. Pap, calcium, exercise, mammogram, hemocult negative  2 -3. For endometrial biopsy  4. Stable    Plan-endometrial biopsy    Endometrial Biopsy Procedure Note    Pre-operative Diagnosis: pmb    Post-operative Diagnosis: same    Indications: postmenopausal bleeding    Procedure Details    The risks (including infection, bleeding, pain, and uterine perforation) and benefits of the procedure were explained to the patient and  verbal informed consent was obtained. Antibiotic prophylaxis against endocarditis was not indicated. The patient was placed in the dorsal lithotomy position. Bimanual exam showed the uterus to be in the neutral position.   A Graves' speculum inserted in the vagina, and the cervix prepped with povidone iodine. Endocervical curettage with a Kevorkian curette was not performed. A sharp tenaculum was applied to the anterior lip of the cervix for stabilization. A sterile uterine sound was used to sound the uterus to a depth of 7.5 cm. A Pipelle endometrial aspirator was used to sample the endometrium. Sample was sent for pathologic examination. Condition:  Stable    Complications:  None    Plan:    The patient was advised to call for any fever or for prolonged or severe pain or bleeding. She was advised to use OTC analgesics as needed for mild to moderate pain. She was advised to avoid vaginal intercourse for 48 hours or until the bleeding has completely stopped. Attending Physician Documentation:  I was present for or participated in the entire procedure, including opening and closing.          Ranjeet Murillo MD

## 2021-06-14 ENCOUNTER — OFFICE VISIT (OUTPATIENT)
Dept: ORTHOPEDIC SURGERY | Age: 66
End: 2021-06-14
Payer: MEDICAID

## 2021-06-14 VITALS — WEIGHT: 205 LBS | RESPIRATION RATE: 12 BRPM | BODY MASS INDEX: 38.71 KG/M2 | HEIGHT: 61 IN

## 2021-06-14 DIAGNOSIS — S83.241A TEAR OF MEDIAL MENISCUS OF RIGHT KNEE, CURRENT, UNSPECIFIED TEAR TYPE, INITIAL ENCOUNTER: Primary | ICD-10-CM

## 2021-06-14 PROCEDURE — 99204 OFFICE O/P NEW MOD 45 MIN: CPT | Performed by: ORTHOPAEDIC SURGERY

## 2021-06-14 PROCEDURE — G8427 DOCREV CUR MEDS BY ELIG CLIN: HCPCS | Performed by: ORTHOPAEDIC SURGERY

## 2021-06-14 PROCEDURE — 1036F TOBACCO NON-USER: CPT | Performed by: ORTHOPAEDIC SURGERY

## 2021-06-14 PROCEDURE — G8417 CALC BMI ABV UP PARAM F/U: HCPCS | Performed by: ORTHOPAEDIC SURGERY

## 2021-06-14 PROCEDURE — 3017F COLORECTAL CA SCREEN DOC REV: CPT | Performed by: ORTHOPAEDIC SURGERY

## 2021-06-14 NOTE — PROGRESS NOTES
Chief Complaint  Knee Pain (NP, right knee pain. Notes having pain ongoing since Feb 2021 when attempting to carry a laundry basket of clothes up from her basement. Reports having a \"pop\" at the time of injury. States over the past month or so her pain has begun to improve. Now only bothers her with prolonged standing/walking and bending her knee at night when sleeping.  )      History of Present Illness:  Zulma Bacon is a pleasant 61 y.o. female for evaluation of her right knee incident in which she felt a pop back in the month of February 2021. She was going up the steps carrying a basket of laundry and then felt a pop in her right knee anteriorly and medially. She had significant pain and some discomfort in the ensuing weeks. Nonetheless this discomfort impoved on its own with some rest and activity modification. She saw her family physician and was referred to my office. At this time she denies any subjective swelling or mechanical symptoms. She only has the feeling that she has some discomfort anteriorly and does not fully trust her knee and so has to brace her movements or feel the need to lean on her grocery cart while  grocery shopping. She has not had any physical therapy. She has not had previous injuries to this knee requiring surgery. She is a retired . Medical History:  Patient's medications, allergies, past medical, surgical, social and family histories were reviewed and updated as appropriate.     Pain Assessment  Location of Pain: Knee  Location Modifiers: Right  Severity of Pain: 0 (5-6 at night or when pain is flared up)  Quality of Pain: Aching, Dull, Throbbing, Popping  Duration of Pain: Persistent  Frequency of Pain: Intermittent  Aggravating Factors: Bending, Standing, Walking, Other (Comment) (Lying on side at night to sleep)  Limiting Behavior: Yes  Relieving Factors: Rest  Result of Injury: Yes  Work-Related Injury: No  Are there other pain locations you wish to document?: No  ROS: Review of systems reviewed from Patient History Form completed today and available in the patient's chart under the Media tab. Pertinent items are noted in HPI  Review of systems reviewed from Patient History Form completed today and available in the patient's chart under the Media tab. Vital Signs:  Resp 12   Ht 5' 1\" (1.549 m)   Wt 205 lb (93 kg)   LMP 02/15/2018   BMI 38.73 kg/m²         Neuro: Alert & oriented x 3,  normal,  no focal deficits noted. Normal affect. Eyes: sclera clear  Ears: Normal external ear  Mouth:  No perioral lesions  Pulm: Respirations unlabored and regular  Pulse: Extremities well perfused. 2+ peripheral pulses. Skin: Warm. No ulcerations. Constitutional: The physical examination finds the patient to be well-developed and well-nourished. The patient is alert and oriented x3 and was cooperative throughout the visit. RIGHT knee exam    Gait: No use of assistive devices. No antalgic gait. Alignment: normal alignment. Inspection/skin: Skin is intact without erythema or ecchymosis. No gross deformity. Palpation: mild crepitus. Moderate medial joint line tenderness present. Range of Motion: 0 to 135 degrees of flexion with no pain at terminal flexion. She lacks 5 degrees extension compared to the other side. Strength: Normal quadriceps development. Effusion: No effusion or swelling present. Ligamentous stability: No cruciate or collateral ligament instability. Neurologic and vascular: Skin is warm and well-perfused. Sensation is intact to light-touch. Special tests: Negative Karen sign. Radiology:       Pertinent imaging reviewed, images only - no report available. Radiographs were obtained and reviewed in the office; 3 views: bilateral PA, bilateral West, bilateral Merchants AND RIGHT lateral    Impression: No acute findings.          Assessment: Patient is a 61 y.o. female right medial

## 2021-06-15 ENCOUNTER — CLINICAL DOCUMENTATION (OUTPATIENT)
Dept: OTHER | Age: 66
End: 2021-06-15

## 2021-06-30 ENCOUNTER — TELEPHONE (OUTPATIENT)
Dept: ORTHOPEDIC SURGERY | Age: 66
End: 2021-06-30

## 2021-06-30 NOTE — TELEPHONE ENCOUNTER
Patient referred to PT at 6/14/21 visit. [facility] com plan requires prior auth. Was this initiated and/or approved?

## 2021-06-30 NOTE — TELEPHONE ENCOUNTER
PATIENT CALLED STATES THAT HER INSURANCE IS REQUIRING A PRIOR AUTH FOR HER PHYSICAL THERAPY.  PLEASE CONTACT TO ADVISE [phone]

## 2021-07-06 NOTE — TELEPHONE ENCOUNTER
PA initiated today. Can take up to 24-48 hours for approval/denial.  Awaiting decision and if further clinical information is needed.

## 2021-07-09 ENCOUNTER — TELEPHONE (OUTPATIENT)
Dept: ORTHOPEDIC SURGERY | Age: 66
End: 2021-07-09

## 2021-07-09 NOTE — TELEPHONE ENCOUNTER
General Question     Subject: PHYSICAL THERAPY   Patient and /or Facility Request: Dexter Haddad  Contact Number: [phone]    PATIENT CALLING TO FOLLOW UP ON IF HER PHYSICAL THERAPY HAS BEEN APPROVED.     PLEASE CALL BACK PATIENT AT THE ABOVE NUMBER

## 2021-07-26 DIAGNOSIS — I10 ESSENTIAL HYPERTENSION: ICD-10-CM

## 2021-07-26 RX ORDER — ATORVASTATIN CALCIUM 10 MG/1
TABLET, FILM COATED ORAL
Qty: 30 TABLET | Refills: 5 | Status: SHIPPED | OUTPATIENT
Start: 2021-07-26 | End: 2022-01-25

## 2021-07-26 RX ORDER — AMLODIPINE BESYLATE 10 MG/1
TABLET ORAL
Qty: 90 TABLET | Refills: 5 | Status: SHIPPED | OUTPATIENT
Start: 2021-07-26 | End: 2022-08-06 | Stop reason: SDUPTHER

## 2021-07-26 NOTE — TELEPHONE ENCOUNTER
Last appointment: 5/17/2021  Next appointment: 11/23/2021  Last refill:   norvasc 90 with 1 12/29/2020  Lipitor 30 with 5 12/29/2020

## 2021-09-29 ENCOUNTER — E-VISIT (OUTPATIENT)
Dept: INTERNAL MEDICINE CLINIC | Age: 66
End: 2021-09-29
Payer: MEDICAID

## 2021-09-29 DIAGNOSIS — J06.9 VIRAL URI: Primary | ICD-10-CM

## 2021-09-29 DIAGNOSIS — Z20.822 SUSPECTED COVID-19 VIRUS INFECTION: ICD-10-CM

## 2021-09-29 DIAGNOSIS — R05.9 COUGH: ICD-10-CM

## 2021-09-29 DIAGNOSIS — J34.89 RHINORRHEA: ICD-10-CM

## 2021-09-29 PROCEDURE — 99421 OL DIG E/M SVC 5-10 MIN: CPT | Performed by: INTERNAL MEDICINE

## 2021-09-30 ASSESSMENT — LIFESTYLE VARIABLES: SMOKING_STATUS: NO, I'VE NEVER SMOKED

## 2021-10-01 ENCOUNTER — NURSE ONLY (OUTPATIENT)
Dept: INTERNAL MEDICINE CLINIC | Age: 66
End: 2021-10-01

## 2021-10-01 DIAGNOSIS — R09.81 CONGESTION OF NASAL SINUS: Primary | ICD-10-CM

## 2021-10-02 LAB — SARS-COV-2: NOT DETECTED

## 2021-10-19 ENCOUNTER — E-VISIT (OUTPATIENT)
Dept: INTERNAL MEDICINE CLINIC | Age: 66
End: 2021-10-19
Payer: MEDICAID

## 2021-10-19 DIAGNOSIS — R51.9 ACUTE NONINTRACTABLE HEADACHE, UNSPECIFIED HEADACHE TYPE: ICD-10-CM

## 2021-10-19 DIAGNOSIS — R09.81 CONGESTION OF NASAL SINUS: Primary | ICD-10-CM

## 2021-10-19 PROCEDURE — 99421 OL DIG E/M SVC 5-10 MIN: CPT | Performed by: INTERNAL MEDICINE

## 2021-11-23 ENCOUNTER — OFFICE VISIT (OUTPATIENT)
Dept: INTERNAL MEDICINE CLINIC | Age: 66
End: 2021-11-23
Payer: MEDICAID

## 2021-11-23 VITALS
OXYGEN SATURATION: 98 % | RESPIRATION RATE: 20 BRPM | BODY MASS INDEX: 36.74 KG/M2 | SYSTOLIC BLOOD PRESSURE: 122 MMHG | WEIGHT: 194.6 LBS | DIASTOLIC BLOOD PRESSURE: 70 MMHG | HEIGHT: 61 IN | HEART RATE: 97 BPM

## 2021-11-23 DIAGNOSIS — H57.13 PAIN OF BOTH EYES: ICD-10-CM

## 2021-11-23 DIAGNOSIS — Z13.220 ENCOUNTER FOR LIPID SCREENING FOR CARDIOVASCULAR DISEASE: ICD-10-CM

## 2021-11-23 DIAGNOSIS — Z13.1 SCREENING FOR DIABETES MELLITUS (DM): ICD-10-CM

## 2021-11-23 DIAGNOSIS — Z00.00 ANNUAL PHYSICAL EXAM: Primary | ICD-10-CM

## 2021-11-23 DIAGNOSIS — J01.90 ACUTE SINUSITIS WITH SYMPTOMS > 10 DAYS: ICD-10-CM

## 2021-11-23 DIAGNOSIS — Z23 NEED FOR VACCINATION: ICD-10-CM

## 2021-11-23 DIAGNOSIS — Z13.6 ENCOUNTER FOR LIPID SCREENING FOR CARDIOVASCULAR DISEASE: ICD-10-CM

## 2021-11-23 DIAGNOSIS — R73.03 PREDIABETES: ICD-10-CM

## 2021-11-23 PROCEDURE — G8482 FLU IMMUNIZE ORDER/ADMIN: HCPCS | Performed by: INTERNAL MEDICINE

## 2021-11-23 PROCEDURE — 90674 CCIIV4 VAC NO PRSV 0.5 ML IM: CPT | Performed by: INTERNAL MEDICINE

## 2021-11-23 PROCEDURE — 90471 IMMUNIZATION ADMIN: CPT | Performed by: INTERNAL MEDICINE

## 2021-11-23 PROCEDURE — 99396 PREV VISIT EST AGE 40-64: CPT | Performed by: INTERNAL MEDICINE

## 2021-11-23 RX ORDER — DOXYCYCLINE HYCLATE 100 MG
100 TABLET ORAL 2 TIMES DAILY
Qty: 14 TABLET | Refills: 0 | Status: SHIPPED | OUTPATIENT
Start: 2021-11-23 | End: 2021-11-30

## 2021-11-23 NOTE — PROGRESS NOTES
History and Physical      Randal Rogers  YOB: 1955    Date of Service:  11/23/2021    Vitals:    11/23/21 1249   BP: 122/70   Pulse: 97   Resp: 20   SpO2: 98%   Weight: 194 lb 9.6 oz (88.3 kg)   Height: 5' 1.2\" (1.554 m)     Body mass index is 36.53 kg/m². Wt Readings from Last 3 Encounters:   11/23/21 194 lb 9.6 oz (88.3 kg)   06/14/21 205 lb (93 kg)   06/01/21 205 lb (93 kg)     BP Readings from Last 3 Encounters:   11/23/21 122/70   06/01/21 122/73   05/17/21 124/62        Chief Arnaldo Mckeon is a 61 y.o. female who presents for complete physical examination. HPI:   2 weeks of headache, frontal facial pain and pain with EOMI to sides. Feels congested too. No nasal discharge. Visual acuity okay. No eye redness. Last eye exam was a year ago. Dr. Angulo in [place].      Patient Active Problem List   Diagnosis    Pre-diabetes    High cholesterol    Goiter    Iron deficiency anemia due to chronic blood loss    Essential hypertension    Morbidly obese (HCC)       No Known Allergies  Outpatient Medications Marked as Taking for the 11/23/21 encounter (Office Visit) with Leo Alberts MD   Medication Sig Dispense Refill    amLODIPine (NORVASC) 10 MG tablet Take 1 tablet by mouth once daily 90 tablet 5    atorvastatin (LIPITOR) 10 MG tablet Take 1 tablet by mouth once daily 30 tablet 5    metFORMIN (GLUCOPHAGE) 500 MG tablet Take 1 tablet by mouth once daily with breakfast 30 tablet 5    diclofenac sodium (VOLTAREN) 1 % GEL Apply topically 2 times daily 100 g 5    vitamin D 1000 units CAPS Take 1 capsule by mouth daily      Multiple Vitamins-Minerals (CENTRUM SILVER 50+WOMEN PO) Take 1 tablet by mouth daily         Past Medical History:   Diagnosis Date    Fibroid     Goiter     High cholesterol     Hypertension     Infertility, female     Irregular uterine bleeding     Menopause ovarian failure     Menorrhagia     Polycystic ovary syndrome     Pre-diabetes Past Surgical History:   Procedure Laterality Date    BREAST BIOPSY Left 2012    Calcifications    LAPAROSCOPY  1980    SHOULDER SURGERY Right 2016     Family History   Problem Relation Age of Onset    High Cholesterol Mother     Diabetes Mother     Thyroid Disease Mother     Anemia Mother     Dementia Mother     High Blood Pressure Father     Heart Failure Father     Stroke Father     Heart Disease Father     Heart Attack Father     Diabetes Sister     Heart Attack Sister 46    Arthritis Sister     Cervical Cancer Maternal Grandmother     No Known Problems Maternal Grandfather     No Known Problems Paternal Grandmother     Heart Attack Paternal Grandfather        Review of Systems:  A comprehensive review of systems was negative except for what was noted in the HPI. Physical Exam  Constitutional:       General: She is not in acute distress. Appearance: Normal appearance. She is not diaphoretic. HENT:      Head: Normocephalic and atraumatic. Right Ear: Tympanic membrane, ear canal and external ear normal.      Left Ear: Tympanic membrane, ear canal and external ear normal.      Nose: Nose normal. No congestion or rhinorrhea. Mouth/Throat:      Mouth: Mucous membranes are moist.      Pharynx: Oropharynx is clear. Eyes:      General: No scleral icterus. Extraocular Movements: Extraocular movements intact. Conjunctiva/sclera: Conjunctivae normal.      Pupils: Pupils are equal, round, and reactive to light. Comments: Eyes are quiet   Neck:      Vascular: No carotid bruit. Comments: +thyromegaly  Cardiovascular:      Rate and Rhythm: Normal rate and regular rhythm. Pulses: Normal pulses. Heart sounds: Normal heart sounds. No murmur heard. No friction rub. No gallop. Pulmonary:      Effort: Pulmonary effort is normal.      Breath sounds: Normal breath sounds. No wheezing, rhonchi or rales. Abdominal:      General: Abdomen is flat.  Bowel sounds are normal.      Palpations: There is no mass. Tenderness: There is no abdominal tenderness. There is no guarding or rebound. Musculoskeletal:         General: No deformity. Cervical back: Normal range of motion. Right lower leg: No edema. Left lower leg: No edema. Lymphadenopathy:      Cervical: No cervical adenopathy. Skin:     General: Skin is warm and dry. Findings: No rash. Neurological:      General: No focal deficit present. Mental Status: She is alert. Coordination: Coordination normal.      Gait: Gait normal.   Psychiatric:         Mood and Affect: Mood normal.         Behavior: Behavior normal.          Lipid panel:  Lab Results   Component Value Date    CHOL 137 11/11/2020    TRIG 123 11/11/2020    HDL 42 11/11/2020    LDLCALC 70 11/11/2020     Glucose:   Glucose (mg/dL)   Date Value   05/17/2021 98       No results found for this visit on 11/23/21.      Preventive Care:  Hx abnormal PAP: no  Self-breast exams: yes  Hx of abnormal mammogram: no  Previous DEXA scan: no  Eye exam within the past 2 years: yes  Dental exam every 6 months: yes  Exercise: no regular exercise  Social History     Socioeconomic History    Marital status:      Spouse name: None    Number of children: None    Years of education: None    Highest education level: None   Occupational History    Occupation: retired    Tobacco Use    Smoking status: Never Smoker    Smokeless tobacco: Never Used   Vaping Use    Vaping Use: Never used   Substance and Sexual Activity    Alcohol use: No    Drug use: No    Sexual activity: Yes     Partners: Male   Other Topics Concern    None   Social History Narrative    None     Social Determinants of Health     Financial Resource Strain:     Difficulty of Paying Living Expenses: Not on file   Food Insecurity:     Worried About Running Out of Food in the Last Year: Not on file    Mehrdad of Food in the Last Year: Not on file   Transportation Needs:     Lack of Transportation (Medical): Not on file    Lack of Transportation (Non-Medical):  Not on file   Physical Activity:     Days of Exercise per Week: Not on file    Minutes of Exercise per Session: Not on file   Stress:     Feeling of Stress : Not on file   Social Connections:     Frequency of Communication with Friends and Family: Not on file    Frequency of Social Gatherings with Friends and Family: Not on file    Attends [employer] Services: Not on file    Active Member of [address] [place] [address] [employer] or Organizations: Not on file    Attends Club or Organization Meetings: Not on file    Marital Status: Not on file   Intimate Partner Violence:     Fear of Current or Ex-Partner: Not on file    Emotionally Abused: Not on file    Physically Abused: Not on file    Sexually Abused: Not on file   Housing Stability:     Unable to Pay for Housing in the Last Year: Not on file    Number of [place] in the Last Year: Not on file    Unstable Housing in the Last Year: Not on file     Social History     Substance and Sexual Activity   Sexual Activity Yes    Partners: Male       Advance Directive: N, <no information>    Immunization History   Administered Date(s) Administered    COVID-19, Rosie Alanis, Primary or Immunocompromised, PF, 100mcg/0.5mL 03/11/2021, 04/08/2021    Influenza Vaccine, unspecified formulation 09/23/2010, 10/02/2012    Influenza Virus Vaccine 09/26/2020    Influenza, Alyssa Pleva, IM, PF (6 mo and older Fluzone, Flulaval, Fluarix, and 3 yrs and older Afluria) 10/27/2017, 10/04/2018, 09/06/2019, 09/26/2020    MMR 08/18/2003    PPD Test 01/23/2012, 04/09/2014, 09/14/2015    Tdap (Boostrix, Adacel) 09/23/2010, 11/16/2020       Health Maintenance   Topic Date Due    Shingles Vaccine (1 of 2) Never done    Flu vaccine (1) 09/01/2021    COVID-19 Vaccine (3 - Booster for Moderna series) 10/08/2021    A1C test (Diabetic or Prediabetic)  11/11/2021    Lipid screen  11/11/2021   

## 2022-01-25 DIAGNOSIS — R73.03 PRE-DIABETES: ICD-10-CM

## 2022-01-25 RX ORDER — ATORVASTATIN CALCIUM 10 MG/1
TABLET, FILM COATED ORAL
Qty: 90 TABLET | Refills: 0 | Status: SHIPPED | OUTPATIENT
Start: 2022-01-25 | End: 2022-05-02

## 2022-04-12 ENCOUNTER — HOSPITAL ENCOUNTER (OUTPATIENT)
Dept: MAMMOGRAPHY | Age: 67
Discharge: HOME | End: 2022-04-12
Payer: MEDICAID

## 2022-04-12 VITALS — HEIGHT: 61 IN | WEIGHT: 196 LBS | BODY MASS INDEX: 37 KG/M2

## 2022-04-12 DIAGNOSIS — Z12.31 VISIT FOR SCREENING MAMMOGRAM: ICD-10-CM

## 2022-04-12 PROCEDURE — 77063 BREAST TOMOSYNTHESIS BI: CPT

## 2022-05-02 RX ORDER — ATORVASTATIN CALCIUM 10 MG/1
TABLET, FILM COATED ORAL
Qty: 90 TABLET | Refills: 3 | Status: SHIPPED | OUTPATIENT
Start: 2022-05-02

## 2022-05-27 DIAGNOSIS — R73.03 PRE-DIABETES: ICD-10-CM

## 2022-05-27 NOTE — TELEPHONE ENCOUNTER
Pt is requesting a 90 day refill on the pended medication    No forms or calls was sent       LOV-11/23/2021  NOV-8/9/2022  Last filled- 1/25/22 with 5 refills

## 2022-05-27 NOTE — TELEPHONE ENCOUNTER
Mary with [address] is calling to check on status of re-fill request sent earlier this week for Metformin. Please call her at number provided with any updates.

## 2022-08-06 DIAGNOSIS — I10 ESSENTIAL HYPERTENSION: ICD-10-CM

## 2022-08-06 DIAGNOSIS — R73.03 PRE-DIABETES: ICD-10-CM

## 2022-08-08 RX ORDER — AMLODIPINE BESYLATE 10 MG/1
10 TABLET ORAL DAILY
Qty: 90 TABLET | Refills: 0 | Status: SHIPPED | OUTPATIENT
Start: 2022-08-08

## 2022-08-09 ENCOUNTER — OFFICE VISIT (OUTPATIENT)
Dept: INTERNAL MEDICINE CLINIC | Age: 67
End: 2022-08-09
Payer: MEDICAID

## 2022-08-09 VITALS
SYSTOLIC BLOOD PRESSURE: 112 MMHG | HEART RATE: 72 BPM | OXYGEN SATURATION: 98 % | BODY MASS INDEX: 35.86 KG/M2 | RESPIRATION RATE: 16 BRPM | DIASTOLIC BLOOD PRESSURE: 66 MMHG | WEIGHT: 189.8 LBS

## 2022-08-09 DIAGNOSIS — Z23 NEED FOR VACCINATION: ICD-10-CM

## 2022-08-09 DIAGNOSIS — I10 ESSENTIAL HYPERTENSION: Primary | ICD-10-CM

## 2022-08-09 DIAGNOSIS — R73.03 PRE-DIABETES: ICD-10-CM

## 2022-08-09 DIAGNOSIS — Z78.0 POSTMENOPAUSAL: ICD-10-CM

## 2022-08-09 DIAGNOSIS — E78.2 MIXED HYPERLIPIDEMIA: ICD-10-CM

## 2022-08-09 DIAGNOSIS — Z12.11 COLON CANCER SCREENING: ICD-10-CM

## 2022-08-09 PROCEDURE — 99214 OFFICE O/P EST MOD 30 MIN: CPT | Performed by: INTERNAL MEDICINE

## 2022-08-09 PROCEDURE — 1123F ACP DISCUSS/DSCN MKR DOCD: CPT | Performed by: INTERNAL MEDICINE

## 2022-08-09 PROCEDURE — 90677 PCV20 VACCINE IM: CPT | Performed by: INTERNAL MEDICINE

## 2022-08-09 PROCEDURE — 90471 IMMUNIZATION ADMIN: CPT | Performed by: INTERNAL MEDICINE

## 2022-08-09 ASSESSMENT — ENCOUNTER SYMPTOMS
WHEEZING: 0
COUGH: 0
SHORTNESS OF BREATH: 0

## 2022-08-09 ASSESSMENT — PATIENT HEALTH QUESTIONNAIRE - PHQ9
SUM OF ALL RESPONSES TO PHQ QUESTIONS 1-9: 0
SUM OF ALL RESPONSES TO PHQ QUESTIONS 1-9: 0
1. LITTLE INTEREST OR PLEASURE IN DOING THINGS: 0
2. FEELING DOWN, DEPRESSED OR HOPELESS: 0
SUM OF ALL RESPONSES TO PHQ QUESTIONS 1-9: 0
SUM OF ALL RESPONSES TO PHQ9 QUESTIONS 1 & 2: 0
SUM OF ALL RESPONSES TO PHQ QUESTIONS 1-9: 0

## 2022-08-09 NOTE — ASSESSMENT & PLAN NOTE
Lab Results   Component Value Date    LDLCALC 93 11/23/2021     Tolerating atorvastatin 10 mg daily without issue.

## 2022-08-09 NOTE — ASSESSMENT & PLAN NOTE
Taking metformin. No issues with that. Lab Results   Component Value Date    LABA1C 6.1 11/23/2021     Lab Results   Component Value Date    .4 11/23/2021     Working on lifestyle modifications.

## 2022-08-09 NOTE — PROGRESS NOTES
Giancarlo Schroeder (:  1955) is a 74 y.o. female,Established patient, here for evaluation of the following chief complaint(s):  Follow-up      ASSESSMENT/PLAN:  1. Essential hypertension  Assessment & Plan:  Blood pressure at goal.  Continue amlodipine 10 mg daily. Labs at next visit. 2. Pre-diabetes  Assessment & Plan:  Taking metformin. No issues with that. Lab Results   Component Value Date    LABA1C 6.1 2021     Lab Results   Component Value Date    .4 2021     Working on lifestyle modifications. 3. Colon cancer screening  -     [facility] - Rosemarie Haas MD, Gastroenterology, [facility]  4. Postmenopausal  -     DEXA BONE DENSITY AXIAL SKELETON; Future  5. Need for vaccination  -     Pneumococcal, PCV20, PREVNAR 20, (age 25 yrs+), IM, PF  6. Mixed hyperlipidemia  Assessment & Plan:  Lab Results   Component Value Date    LDLCALC 93 2021     Tolerating atorvastatin 10 mg daily without issue. Discussed COVID booster in the fall. Return in about 6 months (around 2023) for Welcome to Medicare. SUBJECTIVE/OBJECTIVE:  HPI    Overall she is feeling well. She is staying active around the house. She is taking her medications as prescribed with no issue. Review of Systems   Constitutional:  Negative for chills, fatigue and fever. Respiratory:  Negative for cough, shortness of breath and wheezing. Cardiovascular:  Negative for chest pain, palpitations and leg swelling. Psychiatric/Behavioral:  Negative for dysphoric mood. Current Outpatient Medications on File Prior to Visit   Medication Sig Dispense Refill    amLODIPine (NORVASC) 10 MG tablet Take 1 tablet by mouth in the morning.  90 tablet 0    metFORMIN (GLUCOPHAGE) 500 MG tablet Take 1 tablet by mouth once daily with breakfast 30 tablet 5    atorvastatin (LIPITOR) 10 MG tablet Take 1 tablet by mouth once daily 90 tablet 3    diclofenac sodium (VOLTAREN) 1 % GEL Apply topically 2 times daily 100 g 5    vitamin D 1000 units CAPS Take 1 capsule by mouth daily      Multiple Vitamins-Minerals (CENTRUM SILVER 50+WOMEN PO) Take 1 tablet by mouth daily       No current facility-administered medications on file prior to visit. Vitals:    08/09/22 0925   BP: 112/66   Pulse: 72   Resp: 16   SpO2: 98%     Physical Exam  Constitutional:       General: She is not in acute distress. Appearance: Normal appearance. She is not diaphoretic. HENT:      Head: Normocephalic and atraumatic. Right Ear: External ear normal.      Left Ear: External ear normal.      Nose: Nose normal.      Mouth/Throat:      Mouth: Mucous membranes are moist.      Pharynx: Oropharynx is clear. Eyes:      General: No scleral icterus. Conjunctiva/sclera: Conjunctivae normal.   Cardiovascular:      Rate and Rhythm: Normal rate and regular rhythm. Pulses: Normal pulses. Heart sounds: Normal heart sounds. No murmur heard. No friction rub. No gallop. Pulmonary:      Effort: Pulmonary effort is normal.      Breath sounds: Normal breath sounds. No wheezing, rhonchi or rales. Abdominal:      General: Abdomen is flat. Bowel sounds are normal.   Musculoskeletal:         General: No deformity. Cervical back: Normal range of motion. Right lower leg: No edema. Left lower leg: No edema. Skin:     General: Skin is warm and dry. Findings: No rash. Neurological:      General: No focal deficit present. Mental Status: She is alert. Coordination: Coordination normal.      Gait: Gait normal.   Psychiatric:         Mood and Affect: Mood normal.         Behavior: Behavior normal.         On this date 8/9/2022 I have spent 30 minutes reviewing previous notes, test results and face to face with the patient discussing the diagnosis and importance of compliance with the treatment plan as well as documenting on the day of the visit.       An electronic signature was used to authenticate this note.    --Otis Khalil MD

## 2022-08-18 ENCOUNTER — OFFICE VISIT (OUTPATIENT)
Dept: GYNECOLOGY | Age: 67
End: 2022-08-18
Payer: MEDICAID

## 2022-08-18 VITALS
HEIGHT: 62 IN | BODY MASS INDEX: 34.85 KG/M2 | HEART RATE: 85 BPM | RESPIRATION RATE: 17 BRPM | OXYGEN SATURATION: 98 % | SYSTOLIC BLOOD PRESSURE: 114 MMHG | WEIGHT: 189.4 LBS | DIASTOLIC BLOOD PRESSURE: 70 MMHG

## 2022-08-18 DIAGNOSIS — Z01.419 WELL WOMAN EXAM WITH ROUTINE GYNECOLOGICAL EXAM: Primary | ICD-10-CM

## 2022-08-18 DIAGNOSIS — Z78.0 MENOPAUSE: ICD-10-CM

## 2022-08-18 PROCEDURE — 99397 PER PM REEVAL EST PAT 65+ YR: CPT | Performed by: OBSTETRICS & GYNECOLOGY

## 2022-08-18 ASSESSMENT — ENCOUNTER SYMPTOMS
EYES NEGATIVE: 1
GASTROINTESTINAL NEGATIVE: 1
RESPIRATORY NEGATIVE: 1
ALLERGIC/IMMUNOLOGIC NEGATIVE: 1

## 2022-08-18 NOTE — PROGRESS NOTES
Subjective:      Patient ID: Rekha Romero is a 74 y.o. female. Patient is here for annual. Patient in menopause. Gynecologic Exam      Review of Systems   Constitutional: Negative. HENT: Negative. Eyes: Negative. Respiratory: Negative. Cardiovascular: Negative. Gastrointestinal: Negative. Endocrine: Negative. Genitourinary: Negative. Musculoskeletal: Negative. Skin: Negative. Allergic/Immunologic: Negative. Neurological: Negative. Hematological: Negative. Psychiatric/Behavioral: Negative.        Date of Birth 1955  Past Medical History:   Diagnosis Date    Fibroid     Goiter     High cholesterol     Hypertension     Infertility, female     Irregular uterine bleeding     Menopause ovarian failure     Menorrhagia     Polycystic ovary syndrome     Pre-diabetes      Past Surgical History:   Procedure Laterality Date    BREAST BIOPSY Left 2012    Calcifications    LAPAROSCOPY  1980    SHOULDER SURGERY Right 2016     OB History    Para Term  AB Living   0 0 0 0 0 0   SAB IAB Ectopic Molar Multiple Live Births   0 0 0 0 0 0   Obstetric Comments   Menarche @ 17years of age     Social History     Socioeconomic History    Marital status:      Spouse name: Not on file    Number of children: Not on file    Years of education: Not on file    Highest education level: Not on file   Occupational History    Occupation: retired    Tobacco Use    Smoking status: Never    Smokeless tobacco: Never   Vaping Use    Vaping Use: Never used   Substance and Sexual Activity    Alcohol use: No    Drug use: No    Sexual activity: Yes     Partners: Male   Other Topics Concern    Not on file   Social History Narrative    Not on file     Social Determinants of Health     Financial Resource Strain: Not on file   Food Insecurity: Not on file   Transportation Needs: Not on file   Physical Activity: Not on file   Stress: Not on file   Social Connections: Not on file   Intimate Partner Violence: Not on file   Housing Stability: Not on file     No Known Allergies  Outpatient Medications Marked as Taking for the 8/18/22 encounter (Office Visit) with Alexandra Weinstein MD   Medication Sig Dispense Refill    amLODIPine (NORVASC) 10 MG tablet Take 1 tablet by mouth in the morning. 90 tablet 0    metFORMIN (GLUCOPHAGE) 500 MG tablet Take 1 tablet by mouth once daily with breakfast 30 tablet 5    atorvastatin (LIPITOR) 10 MG tablet Take 1 tablet by mouth once daily 90 tablet 3    diclofenac sodium (VOLTAREN) 1 % GEL Apply topically 2 times daily 100 g 5    vitamin D 1000 units CAPS Take 1 capsule by mouth daily      Multiple Vitamins-Minerals (CENTRUM SILVER 50+WOMEN PO) Take 1 tablet by mouth daily       Family History   Problem Relation Age of Onset    High Cholesterol Mother     Diabetes Mother     Thyroid Disease Mother     Anemia Mother     Dementia Mother     High Blood Pressure Father     Heart Failure Father     Stroke Father     Heart Disease Father     Heart Attack Father     Diabetes Sister     Heart Attack Sister 46    Arthritis Sister     Cervical Cancer Maternal Grandmother     No Known Problems Maternal Grandfather     No Known Problems Paternal Grandmother     Heart Attack Paternal Grandfather     Breast Cancer Neg Hx     Ovarian Cancer Neg Hx      /70 (Site: Right Upper Arm, Position: Sitting, Cuff Size: Large Adult)   Pulse 85   Resp 17   Ht 5' 2\" (1.575 m)   Wt 189 lb 6.4 oz (85.9 kg)   LMP 02/15/2018   SpO2 98%   BMI 34.64 kg/m²       Objective:   Physical Exam  Constitutional:       General: She is not in acute distress. Appearance: Normal appearance. She is well-developed and normal weight. She is not diaphoretic. HENT:      Head: Normocephalic and atraumatic. Nose: Nose normal.      Mouth/Throat:      Mouth: Mucous membranes are moist.      Pharynx: Oropharynx is clear.    Eyes:      Extraocular Movements: Extraocular movements intact. Neck:      Thyroid: No thyromegaly. Cardiovascular:      Rate and Rhythm: Normal rate and regular rhythm. Heart sounds: Normal heart sounds. No murmur heard. No friction rub. No gallop. Pulmonary:      Effort: Pulmonary effort is normal. No respiratory distress. Breath sounds: Normal breath sounds. No wheezing or rales. Chest:   Breasts:     Right: Normal. No swelling, bleeding, inverted nipple, mass, nipple discharge, skin change or tenderness. Left: Normal. No swelling, bleeding, inverted nipple, mass, nipple discharge, skin change or tenderness. Abdominal:      General: Abdomen is flat. Bowel sounds are normal. There is no distension. Palpations: Abdomen is soft. There is no hepatomegaly or mass. Tenderness: There is no abdominal tenderness. There is no guarding or rebound. Hernia: No hernia is present. There is no hernia in the left inguinal area. Genitourinary:     General: Normal vulva. Exam position: Lithotomy position. Pubic Area: No rash. Labia:         Right: No rash, tenderness, lesion or injury. Left: No rash, tenderness, lesion or injury. Urethra: No prolapse, urethral pain, urethral swelling or urethral lesion. Vagina: Normal. No signs of injury and foreign body. No vaginal discharge, erythema, tenderness or bleeding. Cervix: No cervical motion tenderness, discharge, friability, lesion, erythema, cervical bleeding or eversion. Uterus: Not deviated, not enlarged, not fixed, not tender and no uterine prolapse. Adnexa:         Right: No mass, tenderness or fullness. Left: No mass, tenderness or fullness. Rectum: Normal. Guaiac result negative. No mass, tenderness, anal fissure, external hemorrhoid or internal hemorrhoid. Normal anal tone. Comments: Normal urethral meatus, nl urethra, nl bladder.     Musculoskeletal:         General: No swelling, tenderness, deformity or signs of injury. Normal range of motion. Cervical back: Normal range of motion and neck supple. No rigidity. Lymphadenopathy:      Cervical: No cervical adenopathy. Lower Body: No right inguinal adenopathy. No left inguinal adenopathy. Skin:     General: Skin is warm and dry. Coloration: Skin is not jaundiced or pale. Findings: No bruising, erythema, lesion or rash. Neurological:      General: No focal deficit present. Mental Status: She is alert and oriented to person, place, and time. Deep Tendon Reflexes: Reflexes are normal and symmetric. Psychiatric:         Mood and Affect: Mood normal.         Behavior: Behavior normal.         Thought Content:  Thought content normal.         Judgment: Judgment normal.       Assessment:      Annual  menopause      Plan:      Pap, calcium, exercise, mammogram, hemocult negative  stable        Vinay Gan MD

## 2022-09-16 DIAGNOSIS — R73.03 PRE-DIABETES: ICD-10-CM

## 2022-11-14 ENCOUNTER — NURSE ONLY (OUTPATIENT)
Dept: INTERNAL MEDICINE CLINIC | Age: 67
End: 2022-11-14
Payer: MEDICARE

## 2022-11-14 PROCEDURE — G0008 ADMIN INFLUENZA VIRUS VAC: HCPCS | Performed by: INTERNAL MEDICINE

## 2022-11-14 PROCEDURE — 90694 VACC AIIV4 NO PRSRV 0.5ML IM: CPT | Performed by: INTERNAL MEDICINE

## 2022-11-26 DIAGNOSIS — I10 ESSENTIAL HYPERTENSION: ICD-10-CM

## 2022-11-28 RX ORDER — AMLODIPINE BESYLATE 10 MG/1
10 TABLET ORAL DAILY
Qty: 90 TABLET | Refills: 0 | Status: SHIPPED | OUTPATIENT
Start: 2022-11-28

## 2023-02-13 ENCOUNTER — OFFICE VISIT (OUTPATIENT)
Dept: INTERNAL MEDICINE CLINIC | Age: 68
End: 2023-02-13

## 2023-02-13 VITALS
WEIGHT: 192 LBS | HEART RATE: 64 BPM | SYSTOLIC BLOOD PRESSURE: 118 MMHG | RESPIRATION RATE: 14 BRPM | BODY MASS INDEX: 36.25 KG/M2 | OXYGEN SATURATION: 98 % | DIASTOLIC BLOOD PRESSURE: 76 MMHG | HEIGHT: 61 IN

## 2023-02-13 DIAGNOSIS — I10 ESSENTIAL HYPERTENSION: ICD-10-CM

## 2023-02-13 DIAGNOSIS — E78.2 MIXED HYPERLIPIDEMIA: ICD-10-CM

## 2023-02-13 DIAGNOSIS — Z13.6 ENCOUNTER FOR LIPID SCREENING FOR CARDIOVASCULAR DISEASE: ICD-10-CM

## 2023-02-13 DIAGNOSIS — E04.9 GOITER: ICD-10-CM

## 2023-02-13 DIAGNOSIS — Z00.00 INITIAL MEDICARE ANNUAL WELLNESS VISIT: Primary | ICD-10-CM

## 2023-02-13 DIAGNOSIS — E66.01 SEVERE OBESITY (BMI 35.0-39.9) WITH COMORBIDITY (HCC): ICD-10-CM

## 2023-02-13 DIAGNOSIS — Z13.220 ENCOUNTER FOR LIPID SCREENING FOR CARDIOVASCULAR DISEASE: ICD-10-CM

## 2023-02-13 DIAGNOSIS — Z13.1 SCREENING FOR DIABETES MELLITUS (DM): ICD-10-CM

## 2023-02-13 DIAGNOSIS — R73.03 PREDIABETES: ICD-10-CM

## 2023-02-13 SDOH — ECONOMIC STABILITY: FOOD INSECURITY: WITHIN THE PAST 12 MONTHS, YOU WORRIED THAT YOUR FOOD WOULD RUN OUT BEFORE YOU GOT MONEY TO BUY MORE.: NEVER TRUE

## 2023-02-13 SDOH — ECONOMIC STABILITY: FOOD INSECURITY: WITHIN THE PAST 12 MONTHS, THE FOOD YOU BOUGHT JUST DIDN'T LAST AND YOU DIDN'T HAVE MONEY TO GET MORE.: NEVER TRUE

## 2023-02-13 SDOH — ECONOMIC STABILITY: HOUSING INSECURITY
IN THE LAST 12 MONTHS, WAS THERE A TIME WHEN YOU DID NOT HAVE A STEADY PLACE TO SLEEP OR SLEPT IN A SHELTER (INCLUDING NOW)?: NO

## 2023-02-13 SDOH — ECONOMIC STABILITY: INCOME INSECURITY: HOW HARD IS IT FOR YOU TO PAY FOR THE VERY BASICS LIKE FOOD, HOUSING, MEDICAL CARE, AND HEATING?: NOT HARD AT ALL

## 2023-02-13 ASSESSMENT — LIFESTYLE VARIABLES
HOW MANY STANDARD DRINKS CONTAINING ALCOHOL DO YOU HAVE ON A TYPICAL DAY: PATIENT DOES NOT DRINK
HOW OFTEN DO YOU HAVE A DRINK CONTAINING ALCOHOL: NEVER

## 2023-02-13 ASSESSMENT — PATIENT HEALTH QUESTIONNAIRE - PHQ9
2. FEELING DOWN, DEPRESSED OR HOPELESS: 0
SUM OF ALL RESPONSES TO PHQ9 QUESTIONS 1 & 2: 0
SUM OF ALL RESPONSES TO PHQ QUESTIONS 1-9: 0
1. LITTLE INTEREST OR PLEASURE IN DOING THINGS: 0
SUM OF ALL RESPONSES TO PHQ QUESTIONS 1-9: 0

## 2023-02-13 NOTE — PROGRESS NOTES
Medicare Annual Wellness Visit    Mame Pleitez is here for Medicare Dima Polanco   Initial Medicare annual wellness visit  Personalized Preventive Plan is provided to patient in written form- see Patient Instructions  EKG unchanged from prior  Labs as below  Discussed advanced directives  Discussed healthy diet and exercise  Schedule colonoscopy  DEXA and mammo in April. Shingrix at pharmacy  Mental health screenings performed  Sun protection  Healthy diet and exercise    -     EKG - Welcome to Medicare (IPPE)  -     Lipid Panel; Future  -     Hemoglobin A1C; Future  -     CBC with Auto Differential; Future  -     Comprehensive Metabolic Panel; Future  Encounter for lipid screening for cardiovascular disease  -     Lipid Panel; Future  Prediabetes  -     Hemoglobin A1C; Future  Screening for diabetes mellitus (DM)  -     Hemoglobin A1C; Future  Essential hypertension  Assessment & Plan:  Bp at goal today. Continue amlodipine 10mg daily. Labs due. Discussed lifestyle measures. Orders:  -     CBC with Auto Differential; Future  -     Comprehensive Metabolic Panel; Future  Mixed hyperlipidemia  Assessment & Plan:  Lab Results   Component Value Date    LDLCALC 93 11/23/2021     Tolerating atorvastatin 10 mg daily without issue. Due for labs. Orders:  -     Lipid Panel; Future  -     CBC with Auto Differential; Future  -     Comprehensive Metabolic Panel; Future  Severe obesity (BMI 35.0-39. 9) with comorbidity ([address]  Goiter  Assessment & Plan:  No clinical change. No nodules on prior u/s in 2019. Will check TSH. Orders:  -     TSH with Reflex; Future      Recommendations for Preventive Services Due: see orders and patient instructions/AVS.  Recommended screening schedule for the next 5-10 years is provided to the patient in written form: see Patient Instructions/AVS.     Return in about 6 months (around 8/13/2023). Subjective     Overall doing well.      Patient's complete Health Risk Assessment and screening values have been reviewed and are found in Flowsheets. The following problems were reviewed today and where indicated follow up appointments were made and/or referrals ordered. Positive Risk Factor Screenings with Interventions:                 Weight and Activity:  Physical Activity: Inactive    Days of Exercise per Week: 0 days    Minutes of Exercise per Session: 0 min     On average, how many days per week do you engage in moderate to strenuous exercise (like a brisk walk)?: 0 days  Have you lost any weight without trying in the past 3 months?: No  Body mass index: (!) 36.27      Inactivity Interventions:  Patient comments: planning to start exercising at the [facility]  Obesity Interventions:  increase physical activity as follows: start gentle exercise at [facility], work on healthier choices at home          Dentist Screen:  Have you seen the dentist within the past year?: (!) No    Intervention:  Advised to schedule with their dentist      Safety:  Do you have any tripping hazards - loose or unsecured carpets or rugs?: (!) Yes  Do you always fasten your seatbelt when you are in a car?: (!) No  Interventions:  Patient declined any further interventions or treatment     Advanced Directives:  Do you have a Living Will?: (!) No    Intervention:  has NO advanced directive - information provided                       Objective   Vitals:    02/13/23 0858   BP: 118/76   Pulse: 64   Resp: 14   SpO2: 98%   Weight: 192 lb (87.1 kg)   Height: 5' 1\" (1.549 m)      Body mass index is 36.28 kg/m². Physical Exam  Constitutional:       General: She is not in acute distress. Appearance: Normal appearance. She is well-developed. She is not diaphoretic. HENT:      Head: Normocephalic and atraumatic.       Right Ear: Tympanic membrane, ear canal and external ear normal.      Left Ear: Tympanic membrane, ear canal and external ear normal.      Nose: Nose normal.      Mouth/Throat: Mouth: Mucous membranes are moist.      Pharynx: Oropharynx is clear. No oropharyngeal exudate or posterior oropharyngeal erythema. Eyes:      General: No scleral icterus. Conjunctiva/sclera: Conjunctivae normal.      Pupils: Pupils are equal, round, and reactive to light. Neck:      Thyroid: No thyromegaly. Vascular: No carotid bruit or JVD. Comments: No JVD at 90 deg  + thyromegaly- no nodules  Cardiovascular:      Rate and Rhythm: Normal rate and regular rhythm. Pulses: Normal pulses. Heart sounds: Normal heart sounds. No murmur heard. No friction rub. No gallop. Pulmonary:      Effort: Pulmonary effort is normal.      Breath sounds: Normal breath sounds. No wheezing or rales. Abdominal:      General: Bowel sounds are normal. There is no distension. Palpations: Abdomen is soft. There is no mass. Tenderness: There is no abdominal tenderness. There is no guarding or rebound. Musculoskeletal:         General: No tenderness or deformity. Normal range of motion. Cervical back: Normal range of motion and neck supple. Right lower leg: No edema. Left lower leg: No edema. Lymphadenopathy:      Cervical: No cervical adenopathy. Skin:     General: Skin is warm and dry. Findings: No lesion or rash. Neurological:      General: No focal deficit present. Mental Status: She is alert and oriented to person, place, and time. Cranial Nerves: No cranial nerve deficit (grossly intact). Sensory: No sensory deficit. Motor: No weakness. Coordination: Coordination normal.      Gait: Gait normal.   Psychiatric:         Mood and Affect: Mood normal.         Behavior: Behavior normal.     EKG: normal sinus rhythm, t wave flattening inferiorly. Q wave in V2. Delayed r-wave progression. No change from prior. .          No Known Allergies  Prior to Visit Medications    Medication Sig Taking?  Authorizing Provider   amLODIPine (NORVASC) 10 MG tablet TAKE 1 TABLET BY MOUTH IN THE MORNING Yes Cong Sanchez MD   metFORMIN (GLUCOPHAGE) 500 MG tablet Take 1 tablet by mouth once daily with breakfast Yes Cong Sanchez MD   atorvastatin (LIPITOR) 10 MG tablet Take 1 tablet by mouth once daily Yes Cong Sanchez MD   diclofenac sodium (VOLTAREN) 1 % GEL Apply topically 2 times daily Yes Cong Sanchez MD   vitamin D 1000 units CAPS Take 1 capsule by mouth daily Yes Historical Provider, MD   Multiple Vitamins-Minerals (CENTRUM SILVER 50+WOMEN PO) Take 1 tablet by mouth daily Yes Historical Provider, MD [place] (Including outside providers/suppliers regularly involved in providing care):   Patient Care Team:  Cong Sanchez MD as PCP - General (Internal Medicine)  Cong Sanchez MD as PCP - Empaneled Provider  Denise Maldonado MD as Obstetrician (Obstetrics & Gynecology)     Reviewed and updated this visit:  Tobacco  Allergies  Meds  Med Hx  Surg Hx  Soc Hx  Fam Hx             Les Mena MD

## 2023-02-13 NOTE — ASSESSMENT & PLAN NOTE
Lab Results   Component Value Date    LDLCALC 93 11/23/2021     Tolerating atorvastatin 10 mg daily without issue. Due for labs.

## 2023-02-13 NOTE — PATIENT INSTRUCTIONS
Learning About Being Active as an Older Adult  Why is being active important as you get older? Being active is one of the best things you can do for your health. And it's never too late to start. Being active--or getting active, if you aren't already--has definite benefits. It can:  Give you more energy,  Keep your mind sharp. Improve balance to reduce your risk of falls. Help you manage chronic illness with fewer medicines. No matter how old you are, how fit you are, or what health problems you have, there is a form of activity that will work for you. And the more physical activity you can do, the better your overall health will be. What kinds of activity can help you stay healthy? Being more active will make your daily activities easier. Physical activity includes planned exercise and things you do in daily life. There are four types of activity:  Aerobic. Doing aerobic activity makes your heart and lungs strong. Includes walking, dancing, and gardening. Aim for at least 2½ hours spread throughout the week. It improves your energy and can help you sleep better. Muscle-strengthening. This type of activity can help maintain muscle and strengthen bones. Includes climbing stairs, using resistance bands, and lifting or carrying heavy loads. Aim for at least twice a week. It can help protect the knees and other joints. Stretching. Stretching gives you better range of motion in joints and muscles. Includes upper arm stretches, calf stretches, and gentle yoga. Aim for at least twice a week, preferably after your muscles are warmed up from other activities. It can help you function better in daily life. Balancing. This helps you stay coordinated and have good posture. Includes heel-to-toe walking, jacy chi, and certain types of yoga. Aim for at least 3 days a week. It can reduce your risk of falling.   Even if you have a hard time meeting the recommendations, it's better to be more active than less active. All activity done in each category counts toward your weekly total. You'd be surprised how daily things like carrying groceries, keeping up with grandchildren, and taking the stairs can add up. What keeps you from being active? If you've had a hard time being more active, you're not alone. Maybe you remember being able to do more. Or maybe you've never thought of yourself as being active. It's frustrating when you can't do the things you want. Being more active can help. What's holding you back? Getting started. Have a goal, but break it into easy tasks. Small steps build into big accomplishments. Staying motivated. If you feel like skipping your activity, remember your goal. Maybe you want to move better and stay independent. Every activity gets you one step closer. Not feeling your best.  Start with 5 minutes of an activity you enjoy. Prove to yourself you can do it. As you get comfortable, increase your time. You may not be where you want to be. But you're in the process of getting there. Everyone starts somewhere. How can you find safe ways to stay active? Talk with your doctor about any physical challenges you're facing. Make a plan with your doctor if you have a health problem or aren't sure how to get started with activity. If you're already active, ask your doctor if there is anything you should change to stay safe as your body and health change. If you tend to feel dizzy after you take medicine, avoid activity at that time. Try being active before you take your medicine. This will reduce your risk of falls. If you plan to be active at home, make sure to clear your space before you get started. Remove things like TV cords, coffee tables, and throw rugs. It's safest to have plenty of space to move freely. The key to getting more active is to take it slow and steady. Try to improve only a little bit at a time.  Pick just one area to improve on at first. And if an activity hurts, stop and talk to your doctor. Where can you learn more? Go to http://www.agustin.com/ and enter P600 to learn more about \"Learning About Being Active as an Older Adult. \"  Current as of: October 10, 2022               Content Version: 13.5  © 7950-9846 Healthwise, Incorporated. Care instructions adapted under license by [facility] ([facility]). If you have questions about a medical condition or this instruction, always ask your healthcare professional. [place] [address] any warranty or liability for your use of this information. Learning About Dental Care for Older Adults  Dental care for older adults: Overview  Dental care for older people is much the same as for younger adults. But older adults do have concerns that younger adults do not. Older adults may have problems with gum disease and decay on the roots of their teeth. They may need missing teeth replaced or broken fillings fixed. Or they may have dentures that need to be cared for. Some older adults may have trouble holding a toothbrush. You can help remind the person you are caring for to brush and floss their teeth or to clean their dentures. In some cases, you may need to do the brushing and other dental care tasks. People who have trouble using their hands or who have dementia may need this extra help. How can you help with dental care? Normal dental care  To keep the teeth and gums healthy:  Brush the teeth with fluoride toothpaste twice a day--in the morning and at night--and floss at least once a day. Plaque can quickly build up on the teeth of older adults. Watch for the signs of gum disease. These signs include gums that bleed after brushing or after eating hard foods, such as apples. See a dentist regularly. Many experts recommend checkups every 6 months. Keep the dentist up to date on any new medications the person is taking.   Encourage a balanced diet that includes whole grains, vegetables, and fruits, and that is low in saturated fat and sodium. Encourage the person you're caring for not to use tobacco products. They can affect dental and general health. Many older adults have a fixed income and feel that they can't afford dental care. But most towns and [facility] have programs in which dentists help older adults by lowering fees. Contact your area's public health offices or  for information about dental care in your area. Using a toothbrush  Older adults with arthritis sometimes have trouble brushing their teeth because they can't easily hold the toothbrush. Their hands and fingers may be stiff, painful, or weak. If this is the case, you can: Offer an electric toothbrush. Enlarge the handle of a non-electric toothbrush by wrapping a sponge, an elastic bandage, or adhesive tape around it. Push the toothbrush handle through a ball made of rubber or soft foam.  Make the handle longer and thicker by taping Popsicle sticks or tongue depressors to it. You may also be able to buy special toothbrushes, toothpaste dispensers, and floss holders. Your doctor may recommend a soft-bristle toothbrush if the person you care for bleeds easily. Bleeding can happen because of a health problem or from certain medicines. A toothpaste for sensitive teeth may help if the person you care for has sensitive teeth. How do you brush and floss someone's teeth? If the person you are caring for has a hard time cleaning their teeth on their own, you may need to brush and floss their teeth for them. It may be easiest to have the person sit and face away from you, and to sit or stand behind them. That way you can steady their head against your arm as you reach around to floss and brush their teeth. Choose a place that has good lighting and is comfortable for both of you. Before you begin, gather your supplies. You will need gloves, floss, a toothbrush, and a container to hold water if you are not near a sink.  Wash and dry your hands well and put on gloves. Start by flossing:  Gently work a piece of floss between each of the teeth toward the gums. A plastic flossing tool may make this easier, and they are available at most [facility]. Curve the floss around each tooth into a U-shape and gently slide it under the gum line. Move the floss firmly up and down several times to scrape off the plaque. After you've finished flossing, throw away the used floss and begin brushing:  Wet the brush and apply toothpaste. Place the brush at a 45-degree angle where the teeth meet the gums. Press firmly, and move the brush in small circles over the surface of the teeth. Be careful not to brush too hard. Vigorous brushing can make the gums pull away from the teeth and can scratch the tooth enamel. Brush all surfaces of the teeth, on the tongue side and on the cheek side. Pay special attention to the front teeth and all surfaces of the back teeth. Brush chewing surfaces with short back-and-forth strokes. After you've finished, help the person rinse the remaining toothpaste from their mouth. Where can you learn more? Go to http://www.woods.com/ and enter F944 to learn more about \"Learning About Dental Care for Older Adults. \"  Current as of: June 16, 2022               Content Version: 13.5  © 7284-3856 Healthwise, Incorporated. Care instructions adapted under license by [facility] ([facility]). If you have questions about a medical condition or this instruction, always ask your healthcare professional. [place] [address] any warranty or liability for your use of this information. Advance Directives: Care Instructions  Overview  An advance directive is a legal way to state your wishes at the end of your life. It tells your family and your doctor what to do if you can't say what you want. There are two main types of advance directives. You can change them any time your wishes change. Living will.   This form tells your family and your doctor your wishes about life support and other treatment. The form is also called a declaration.  Medical power of .  This form lets you name a person to make treatment decisions for you when you can't speak for yourself. This person is called a health care agent (health care proxy, health care surrogate). The form is also called a durable power of  for health care.  If you do not have an advance directive, decisions about your medical care may be made by a family member, or by a doctor or a  who doesn't know you.  It may help to think of an advance directive as a gift to the people who care for you. If you have one, they won't have to make tough decisions by themselves.  For more information, including forms for your state, see the [employer] website (www.caringinfo.org/planning/advance-directives/).  Follow-up care is a key part of your treatment and safety. Be sure to make and go to all appointments, and call your doctor if you are having problems. It's also a good idea to know your test results and keep a list of the medicines you take.  What should you include in an advance directive?  Many states have a unique advance directive form. (It may ask you to address specific issues.) Or you might use a universal form that's approved by many states.  If your form doesn't tell you what to address, it may be hard to know what to include in your advance directive. Use the questions below to help you get started.  Who do you want to make decisions about your medical care if you are not able to?  What life-support measures do you want if you have a serious illness that gets worse over time or can't be cured?  What are you most afraid of that might happen? (Maybe you're afraid of having pain, losing your independence, or being kept alive by machines.)  Where would you prefer to die? (Your home? A hospital? A nursing home?)  Do you want to donate your organs when you die?  Do you  want certain Alevism practices performed before you die? When should you call for help? Be sure to contact your doctor if you have any questions. Where can you learn more? Go to http://www.agustin.com/ and enter R264 to learn more about \"Advance Directives: Care Instructions. \"  Current as of: June 16, 2022               Content Version: 13.5  © 2006-2022 [employer]. Care instructions adapted under license by [facility] ([facility]). If you have questions about a medical condition or this instruction, always ask your healthcare professional. [address] any warranty or liability for your use of this information. Starting a Weight Loss Plan: Care Instructions  Overview     If you're thinking about losing weight, it can be hard to know where to start. Your doctor can help you set up a weight loss plan that best meets your needs. You may want to take a class on nutrition or exercise, or you could join a weight loss support group. If you have questions about how to make changes to your eating or exercise habits, ask your doctor about seeing a registered dietitian or an exercise specialist.  It can be a big challenge to lose weight. But you don't have to make huge changes at once. Make small changes, and stick with them. When those changes become habit, add a few more changes. If you don't think you're ready to make changes right now, try to pick a date in the future. Make an appointment to see your doctor to discuss whether the time is right for you to start a plan. Follow-up care is a key part of your treatment and safety. Be sure to make and go to all appointments, and call your doctor if you are having problems. It's also a good idea to know your test results and keep a list of the medicines you take. How can you care for yourself at home? Set realistic goals. Many people expect to lose much more weight than is likely.  A weight loss of 5% to 10% of your body weight may be enough to improve your health. Get family and friends involved to provide support. Talk to them about why you are trying to lose weight, and ask them to help. They can help by participating in exercise and having meals with you, even if they may be eating something different. Find what works best for you. If you do not have time or do not like to cook, a program that offers meal replacement bars or shakes may be better for you. Or if you like to prepare meals, finding a plan that includes daily menus and recipes may be best.  Ask your doctor about other health professionals who can help you achieve your weight loss goals. A dietitian can help you make healthy changes in your diet. An exercise specialist or  can help you develop a safe and effective exercise program.  A counselor or psychiatrist can help you cope with issues such as depression, anxiety, or family problems that can make it hard to focus on weight loss. Consider joining a support group for people who are trying to lose weight. Your doctor can suggest groups in your area. Where can you learn more? Go to http://www.woods.com/ and enter U357 to learn more about \"Starting a Weight Loss Plan: Care Instructions. \"  Current as of: August 25, 2022               Content Version: 13.5  © 2006-2022 [employer]. Care instructions adapted under license by [facility] [address] If you have questions about a medical condition or this instruction, always ask your healthcare professional. [place] [address] any warranty or liability for your use of this information. A Healthy Heart: Care Instructions  Your Care Instructions     Coronary artery disease, also called heart disease, occurs when a substance called plaque builds up in the vessels that supply oxygen-rich blood to your heart muscle. This can narrow the blood vessels and reduce blood flow.  A heart attack happens when blood flow is completely blocked. A high-fat diet, smoking, and other factors increase the risk of heart disease. Your doctor has found that you have a chance of having heart disease. You can do lots of things to keep your heart healthy. It may not be easy, but you can change your diet, exercise more, and quit smoking. These steps really work to lower your chance of heart disease. Follow-up care is a key part of your treatment and safety. Be sure to make and go to all appointments, and call your doctor if you are having problems. It's also a good idea to know your test results and keep a list of the medicines you take. How can you care for yourself at home? Diet    Use less salt when you cook and eat. This helps lower your blood pressure. Taste food before salting. Add only a little salt when you think you need it. With time, your taste buds will adjust to less salt.     Eat fewer snack items, fast foods, canned soups, and other high-salt, high-fat, processed foods.     Read food labels and try to avoid saturated and trans fats. They increase your risk of heart disease by raising cholesterol levels.     Limit the amount of solid fat-butter, margarine, and shortening-you eat. Use olive, peanut, or canola oil when you cook. Bake, broil, and steam foods instead of frying them.     Eat a variety of fruit and vegetables every day. Dark green, deep orange, red, or yellow fruits and vegetables are especially good for you. Examples include spinach, carrots, peaches, and berries.     Foods high in fiber can reduce your cholesterol and provide important vitamins and minerals. High-fiber foods include whole-grain cereals and breads, oatmeal, beans, brown rice, citrus fruits, and apples.     Eat lean proteins. Heart-healthy proteins include seafood, lean meats and poultry, eggs, beans, peas, nuts, seeds, and soy products.     Limit drinks and foods with added sugar. These include candy, desserts, and soda pop.    Lifestyle changes    If your doctor recommends it, get more exercise. Walking is a good choice. Bit by bit, increase the amount you walk every day. Try for at least 30 minutes on most days of the week. You also may want to swim, bike, or do other activities.     Do not smoke. If you need help quitting, talk to your doctor about stop-smoking programs and medicines. These can increase your chances of quitting for good. Quitting smoking may be the most important step you can take to protect your heart. It is never too late to quit.     Limit alcohol to 2 drinks a day for men and 1 drink a day for women. Too much alcohol can cause health problems.     Manage other health problems such as diabetes, high blood pressure, and high cholesterol. If you think you may have a problem with alcohol or drug use, talk to your doctor. Medicines    Take your medicines exactly as prescribed. Call your doctor if you think you are having a problem with your medicine.     If your doctor recommends aspirin, take the amount directed each day. Make sure you take aspirin and not another kind of pain reliever, such as acetaminophen (Tylenol). When should you call for help? Call 911 if you have symptoms of a heart attack. These may include:    Chest pain or pressure, or a strange feeling in the chest.     Sweating.     Shortness of breath.     Pain, pressure, or a strange feeling in the back, neck, jaw, or upper belly or in one or both shoulders or arms.     Lightheadedness or sudden weakness.     A fast or irregular heartbeat. After you call 911, the  may tell you to chew 1 adult-strength or 2 to 4 low-dose aspirin. Wait for an ambulance. Do not try to drive yourself. Watch closely for changes in your health, and be sure to contact your doctor if you have any problems. Where can you learn more? Go to http://www.agustin.com/ and enter F075 to learn more about \"A Healthy Heart: Care Instructions. \"  Current as of: September 7, 8268               XTVAIOW Version: 13.5  © 7155-7543 Healthwise, [employer]. Care instructions adapted under license by [facility] ([facility]). If you have questions about a medical condition or this instruction, always ask your healthcare professional. [address] any warranty or liability for your use of this information. Personalized Preventive Plan for Timothy Song - 2/13/2023  Medicare offers a range of preventive health benefits. Some of the tests and screenings are paid in full while other may be subject to a deductible, co-insurance, and/or copay. Some of these benefits include a comprehensive review of your medical history including lifestyle, illnesses that may run in your family, and various assessments and screenings as appropriate. After reviewing your medical record and screening and assessments performed today your provider may have ordered immunizations, labs, imaging, and/or referrals for you. A list of these orders (if applicable) as well as your Preventive Care list are included within your After Visit Summary for your review. Other Preventive Recommendations:    A preventive eye exam performed by an eye specialist is recommended every 1-2 years to screen for glaucoma; cataracts, macular degeneration, and other eye disorders. A preventive dental visit is recommended every 6 months. Try to get at least 150 minutes of exercise per week or 10,000 steps per day on a pedometer . Order or download the FREE \"Exercise & Physical Activity: Your Everyday Guide\" from [employer]. Call 0-375.804.4371 or search [employer] online. You need 1371-3732 mg of calcium and 7072-7785 IU of vitamin D per day.  It is possible to meet your calcium requirement with diet alone, but a vitamin D supplement is usually necessary to meet this goal.  When exposed to the sun, use a sunscreen that protects against both UVA and UVB radiation with an SPF of 30 or greater. Reapply every 2 to 3 hours or after sweating, drying off with a towel, or swimming. Always wear a seat belt when traveling in a car. Always wear a helmet when riding a bicycle or motorcycle.

## 2023-02-22 DIAGNOSIS — R73.03 PRE-DIABETES: ICD-10-CM

## 2023-02-22 DIAGNOSIS — I10 ESSENTIAL HYPERTENSION: ICD-10-CM

## 2023-02-23 RX ORDER — AMLODIPINE BESYLATE 10 MG/1
10 TABLET ORAL DAILY
Qty: 90 TABLET | Refills: 1 | Status: SHIPPED | OUTPATIENT
Start: 2023-02-23

## 2023-05-04 ENCOUNTER — HOSPITAL ENCOUNTER (OUTPATIENT)
Dept: MAMMOGRAPHY | Age: 68
Discharge: HOME | End: 2023-05-04
Payer: MEDICARE

## 2023-05-04 ENCOUNTER — HOSPITAL ENCOUNTER (OUTPATIENT)
Dept: GENERAL RADIOLOGY | Age: 68
Discharge: HOME | End: 2023-05-04
Payer: MEDICARE

## 2023-05-04 VITALS — BODY MASS INDEX: 37.38 KG/M2 | WEIGHT: 198 LBS | HEIGHT: 61 IN

## 2023-05-04 DIAGNOSIS — Z78.0 POSTMENOPAUSAL: ICD-10-CM

## 2023-05-04 DIAGNOSIS — Z12.31 VISIT FOR SCREENING MAMMOGRAM: ICD-10-CM

## 2023-05-04 PROCEDURE — 77063 BREAST TOMOSYNTHESIS BI: CPT

## 2023-05-04 PROCEDURE — 77080 DXA BONE DENSITY AXIAL: CPT

## 2023-05-25 RX ORDER — ATORVASTATIN CALCIUM 10 MG/1
TABLET, FILM COATED ORAL
Qty: 90 TABLET | Refills: 0 | Status: SHIPPED | OUTPATIENT
Start: 2023-05-25

## 2023-08-17 ENCOUNTER — OFFICE VISIT (OUTPATIENT)
Dept: INTERNAL MEDICINE CLINIC | Age: 68
End: 2023-08-17
Payer: MEDICARE

## 2023-08-17 VITALS
HEART RATE: 82 BPM | DIASTOLIC BLOOD PRESSURE: 64 MMHG | OXYGEN SATURATION: 97 % | BODY MASS INDEX: 36.47 KG/M2 | WEIGHT: 193 LBS | SYSTOLIC BLOOD PRESSURE: 160 MMHG

## 2023-08-17 DIAGNOSIS — I10 ESSENTIAL HYPERTENSION: Primary | ICD-10-CM

## 2023-08-17 DIAGNOSIS — E78.2 MIXED HYPERLIPIDEMIA: ICD-10-CM

## 2023-08-17 PROCEDURE — 1090F PRES/ABSN URINE INCON ASSESS: CPT | Performed by: INTERNAL MEDICINE

## 2023-08-17 PROCEDURE — 1123F ACP DISCUSS/DSCN MKR DOCD: CPT | Performed by: INTERNAL MEDICINE

## 2023-08-17 PROCEDURE — 1036F TOBACCO NON-USER: CPT | Performed by: INTERNAL MEDICINE

## 2023-08-17 PROCEDURE — G8417 CALC BMI ABV UP PARAM F/U: HCPCS | Performed by: INTERNAL MEDICINE

## 2023-08-17 PROCEDURE — 3077F SYST BP >= 140 MM HG: CPT | Performed by: INTERNAL MEDICINE

## 2023-08-17 PROCEDURE — 99214 OFFICE O/P EST MOD 30 MIN: CPT | Performed by: INTERNAL MEDICINE

## 2023-08-17 PROCEDURE — 3017F COLORECTAL CA SCREEN DOC REV: CPT | Performed by: INTERNAL MEDICINE

## 2023-08-17 PROCEDURE — 3078F DIAST BP <80 MM HG: CPT | Performed by: INTERNAL MEDICINE

## 2023-08-17 PROCEDURE — G8399 PT W/DXA RESULTS DOCUMENT: HCPCS | Performed by: INTERNAL MEDICINE

## 2023-08-17 PROCEDURE — G8427 DOCREV CUR MEDS BY ELIG CLIN: HCPCS | Performed by: INTERNAL MEDICINE

## 2023-08-17 RX ORDER — CLINDAMYCIN HYDROCHLORIDE 300 MG/1
CAPSULE ORAL
COMMUNITY
Start: 2023-08-11

## 2023-08-17 RX ORDER — ATORVASTATIN CALCIUM 20 MG/1
20 TABLET, FILM COATED ORAL DAILY
Qty: 90 TABLET | Refills: 3 | Status: SHIPPED | OUTPATIENT
Start: 2023-08-17

## 2023-08-17 RX ORDER — IBUPROFEN 800 MG/1
TABLET ORAL
COMMUNITY
Start: 2023-08-11

## 2023-08-17 RX ORDER — TRIAMTERENE AND HYDROCHLOROTHIAZIDE 37.5; 25 MG/1; MG/1
1 TABLET ORAL DAILY
Qty: 90 TABLET | Refills: 1 | Status: SHIPPED | OUTPATIENT
Start: 2023-08-17

## 2023-08-17 NOTE — ASSESSMENT & PLAN NOTE
Not at goal. Will continue off of amlodipine and start maxzide 25mg daily. If need amlodipine in future would stay at 5mg max dose.

## 2023-08-17 NOTE — ASSESSMENT & PLAN NOTE
Lab Results   Component Value Date    LDLCALC 107 (H) 02/13/2023     Tolerating atorvastatin 10 mg daily without issue. Will increase to 20mg daily given LDL over 100.

## 2023-08-17 NOTE — PROGRESS NOTES
Dav Robertson (:  1955) is a 79 y.o. female,Established patient, here for evaluation of the following chief complaint(s):  6 Month Follow-Up      ASSESSMENT/PLAN:  1. Essential hypertension  Assessment & Plan:  Not at goal. Will continue off of amlodipine and start maxzide 25mg daily. If need amlodipine in future would stay at 5mg max dose. Orders:  -     triamterene-hydroCHLOROthiazide (MAXZIDE-25) 37.5-25 MG per tablet; Take 1 tablet by mouth daily, Disp-90 tablet, R-1Normal  2. Mixed hyperlipidemia  Assessment & Plan:  Lab Results   Component Value Date    LDLCALC 107 (H) 2023     Tolerating atorvastatin 10 mg daily without issue. Will increase to 20mg daily given LDL over 100. Orders:  -     atorvastatin (LIPITOR) 20 MG tablet; Take 1 tablet by mouth daily, Disp-90 tablet, R-3Normal      Return in about 4 weeks (around 2023) for HTN. SUBJECTIVE/OBJECTIVE:  HPI    Stopped her amlodipine 2/2 fatigue. Fatigue is better off of the amlodipine. Has been checking at home BP - 110s at home. On wrist cuff though.  Has been on some ibuprofen and prednisone for a tooth infection    Okay with increasing statin    Review of Systems    Current Outpatient Medications on File Prior to Visit   Medication Sig Dispense Refill    clindamycin (CLEOCIN) 300 MG capsule TAKE 1 CAPSULE BY MOUTH TWICE DAILY FOR 7 DAYS      ibuprofen (ADVIL;MOTRIN) 800 MG tablet TAKE 1 TABLET BY MOUTH THREE TIMES DAILY WITH FOOD      atorvastatin (LIPITOR) 10 MG tablet Take 1 tablet by mouth once daily 90 tablet 0    amLODIPine (NORVASC) 10 MG tablet TAKE 1 TABLET BY MOUTH IN THE MORNING (Patient not taking: Reported on 2023) 90 tablet 1    metFORMIN (GLUCOPHAGE) 500 MG tablet Take 1 tablet by mouth once daily with breakfast 90 tablet 1    diclofenac sodium (VOLTAREN) 1 % GEL Apply topically 2 times daily 100 g 5    vitamin D 1000 units CAPS Take 1 capsule by mouth daily      Multiple Vitamins-Minerals (CENTRUM SILVER

## 2023-09-19 ENCOUNTER — OFFICE VISIT (OUTPATIENT)
Dept: INTERNAL MEDICINE CLINIC | Age: 68
End: 2023-09-19
Payer: MEDICARE

## 2023-09-19 VITALS
OXYGEN SATURATION: 95 % | HEART RATE: 87 BPM | SYSTOLIC BLOOD PRESSURE: 134 MMHG | BODY MASS INDEX: 35.87 KG/M2 | DIASTOLIC BLOOD PRESSURE: 72 MMHG | HEIGHT: 61 IN | WEIGHT: 190 LBS

## 2023-09-19 DIAGNOSIS — I10 ESSENTIAL HYPERTENSION: ICD-10-CM

## 2023-09-19 DIAGNOSIS — Z23 NEEDS FLU SHOT: ICD-10-CM

## 2023-09-19 DIAGNOSIS — L72.9 BENIGN CYST OF SKIN: ICD-10-CM

## 2023-09-19 DIAGNOSIS — K04.7 CHRONIC DENTAL INFECTION: ICD-10-CM

## 2023-09-19 DIAGNOSIS — I10 ESSENTIAL HYPERTENSION: Primary | ICD-10-CM

## 2023-09-19 PROCEDURE — 3078F DIAST BP <80 MM HG: CPT | Performed by: INTERNAL MEDICINE

## 2023-09-19 PROCEDURE — 99214 OFFICE O/P EST MOD 30 MIN: CPT | Performed by: INTERNAL MEDICINE

## 2023-09-19 PROCEDURE — G8417 CALC BMI ABV UP PARAM F/U: HCPCS | Performed by: INTERNAL MEDICINE

## 2023-09-19 PROCEDURE — G8399 PT W/DXA RESULTS DOCUMENT: HCPCS | Performed by: INTERNAL MEDICINE

## 2023-09-19 PROCEDURE — 3075F SYST BP GE 130 - 139MM HG: CPT | Performed by: INTERNAL MEDICINE

## 2023-09-19 PROCEDURE — 1123F ACP DISCUSS/DSCN MKR DOCD: CPT | Performed by: INTERNAL MEDICINE

## 2023-09-19 PROCEDURE — 3017F COLORECTAL CA SCREEN DOC REV: CPT | Performed by: INTERNAL MEDICINE

## 2023-09-19 PROCEDURE — G8427 DOCREV CUR MEDS BY ELIG CLIN: HCPCS | Performed by: INTERNAL MEDICINE

## 2023-09-19 PROCEDURE — 1036F TOBACCO NON-USER: CPT | Performed by: INTERNAL MEDICINE

## 2023-09-19 PROCEDURE — 1090F PRES/ABSN URINE INCON ASSESS: CPT | Performed by: INTERNAL MEDICINE

## 2023-09-19 PROCEDURE — 90694 VACC AIIV4 NO PRSRV 0.5ML IM: CPT | Performed by: INTERNAL MEDICINE

## 2023-09-19 PROCEDURE — G0008 ADMIN INFLUENZA VIRUS VAC: HCPCS | Performed by: INTERNAL MEDICINE

## 2023-09-19 RX ORDER — AMOXICILLIN AND CLAVULANATE POTASSIUM 875; 125 MG/1; MG/1
1 TABLET, FILM COATED ORAL 2 TIMES DAILY
Qty: 14 TABLET | Refills: 0 | Status: SHIPPED | OUTPATIENT
Start: 2023-09-19 | End: 2023-09-26

## 2023-09-19 NOTE — PROGRESS NOTES
Richar Delgado (:  1955) is a 79 y.o. female,Established patient, here for evaluation of the following chief complaint(s):  Hypertension (1 month follow up )      ASSESSMENT/PLAN:  1. Essential hypertension  Assessment & Plan:  Improved. Will continue off of amlodipine and start maxzide 25mg daily. Check BMP. Orders:  -     Basic Metabolic Panel; Future  2. Chronic dental infection - needs definitive mgmt. -     amoxicillin-clavulanate (AUGMENTIN) 875-125 MG per tablet; Take 1 tablet by mouth 2 times daily for 7 days, Disp-14 tablet, R-0Print  3. Needs flu shot  -     Influenza, FLUAD, (age 72 y+), IM, Preservative Free, 0.5 mL  4. Benign cyst of skin  Benign. Discussed not infected. Nothing has to be done. Could see gyn or colorectal surgery if wants removal or further evaluation. Return in about 6 months (around 3/19/2024) for AWV. SUBJECTIVE/OBJECTIVE:  HPI    Overall feeling well  New medication without issue    Dental infection. Has appt next month with oral surgeon. Itchy spots near her rectum. Have been present for a while but more itchy with antibiotic. Review of Systems    Current Outpatient Medications on File Prior to Visit   Medication Sig Dispense Refill    ibuprofen (ADVIL;MOTRIN) 800 MG tablet TAKE 1 TABLET BY MOUTH THREE TIMES DAILY WITH FOOD      atorvastatin (LIPITOR) 20 MG tablet Take 1 tablet by mouth daily 90 tablet 3    triamterene-hydroCHLOROthiazide (MAXZIDE-25) 37.5-25 MG per tablet Take 1 tablet by mouth daily 90 tablet 1    metFORMIN (GLUCOPHAGE) 500 MG tablet Take 1 tablet by mouth once daily with breakfast 90 tablet 1    diclofenac sodium (VOLTAREN) 1 % GEL Apply topically 2 times daily 100 g 5    vitamin D 1000 units CAPS Take 1 capsule by mouth daily      Multiple Vitamins-Minerals (CENTRUM SILVER 50+WOMEN PO) Take 1 tablet by mouth daily       No current facility-administered medications on file prior to visit.         Vitals:    23 1138   BP:

## 2023-09-20 LAB
ANION GAP SERPL CALCULATED.3IONS-SCNC: 11 MMOL/L (ref 3–16)
BUN SERPL-MCNC: 13 MG/DL (ref 7–20)
CALCIUM SERPL-MCNC: 9.4 MG/DL (ref 8.3–10.6)
CHLORIDE SERPL-SCNC: 100 MMOL/L (ref 99–110)
CO2 SERPL-SCNC: 28 MMOL/L (ref 21–32)
CREAT SERPL-MCNC: 0.9 MG/DL (ref 0.6–1.2)
GFR SERPLBLD CREATININE-BSD FMLA CKD-EPI: >60 ML/MIN/{1.73_M2}
GLUCOSE SERPL-MCNC: 102 MG/DL (ref 70–99)
POTASSIUM SERPL-SCNC: 3.6 MMOL/L (ref 3.5–5.1)
SODIUM SERPL-SCNC: 139 MMOL/L (ref 136–145)

## 2023-09-29 DIAGNOSIS — R73.03 PRE-DIABETES: ICD-10-CM

## 2024-01-05 ENCOUNTER — ANESTHESIA EVENT (OUTPATIENT)
Dept: ENDOSCOPY | Age: 69
End: 2024-01-05
Payer: MEDICARE

## 2024-01-05 NOTE — PROGRESS NOTES
ENDOSCOPY PREOP INSTRUCTIONS      Please at arrival time given to you from your doctor's office.  Report to the MAIN entrance on [address] and register at the surgery center on the left-hand side of the lobby  You will need your insurance card and photo id and a list of all medications taken on a regular basis. Please include the dose/frequency.    For your procedure:     PLEASE FOLLOW ALL INSTRUCTIONS & PREPS GIVEN TO YOU BY YOUR DOCTOR'S OFFICE.    If you have not received these instructions yet, please call the office immediately. Make sure to read them as soon as received.   If you are taking blood thinners, Aspirin or diabetic medication, make sure to call your doctor as soon as possible for instructions prior to your procedure.  Please dress comfortably and do not wear any lotion, powders or jewelry  If you use oxygen at home, please bring your oxygen tank with you to hospital.  Arrange for someone to be with you and sign you out & drive you home after your procedure.  THIS PERSON MUST WAIT AT HOSPITAL THE ENTIRE TIME.  We allow 2 adult visitors with you in the hospital & masks are strongly recommended.    WOMEN ONLY OF CHILDBEARING AGE: Please make sure to be able to give a urine sample on arrival      If you have further questions, you may contact your Endoscopist's office or Pre Admission Testing staff at [phone]

## 2024-01-08 ENCOUNTER — HOSPITAL ENCOUNTER (OUTPATIENT)
Age: 69
Setting detail: OUTPATIENT SURGERY
Discharge: HOME | End: 2024-01-08
Attending: INTERNAL MEDICINE | Admitting: INTERNAL MEDICINE
Payer: MEDICARE

## 2024-01-08 ENCOUNTER — ANESTHESIA (OUTPATIENT)
Dept: ENDOSCOPY | Age: 69
End: 2024-01-08
Payer: MEDICARE

## 2024-01-08 VITALS
HEIGHT: 61 IN | WEIGHT: 190 LBS | OXYGEN SATURATION: 100 % | HEART RATE: 79 BPM | TEMPERATURE: 96.8 F | DIASTOLIC BLOOD PRESSURE: 81 MMHG | SYSTOLIC BLOOD PRESSURE: 117 MMHG | BODY MASS INDEX: 35.87 KG/M2 | RESPIRATION RATE: 16 BRPM

## 2024-01-08 DIAGNOSIS — Z12.11 COLON CANCER SCREENING: ICD-10-CM

## 2024-01-08 LAB
GLUCOSE BLD-MCNC: 114 MG/DL (ref 70–99)
PERFORMED ON: ABNORMAL

## 2024-01-08 PROCEDURE — 88305 TISSUE EXAM BY PATHOLOGIST: CPT

## 2024-01-08 PROCEDURE — 7100000011 HC PHASE II RECOVERY - ADDTL 15 MIN: Performed by: INTERNAL MEDICINE

## 2024-01-08 PROCEDURE — 2580000003 HC RX 258: Performed by: ANESTHESIOLOGY

## 2024-01-08 PROCEDURE — 6360000002 HC RX W HCPCS

## 2024-01-08 PROCEDURE — 2709999900 HC NON-CHARGEABLE SUPPLY: Performed by: INTERNAL MEDICINE

## 2024-01-08 PROCEDURE — 3700000001 HC ADD 15 MINUTES (ANESTHESIA): Performed by: INTERNAL MEDICINE

## 2024-01-08 PROCEDURE — 7100000010 HC PHASE II RECOVERY - FIRST 15 MIN: Performed by: INTERNAL MEDICINE

## 2024-01-08 PROCEDURE — 3609010300 HC COLONOSCOPY W/BIOPSY SINGLE/MULTIPLE: Performed by: INTERNAL MEDICINE

## 2024-01-08 PROCEDURE — 3700000000 HC ANESTHESIA ATTENDED CARE: Performed by: INTERNAL MEDICINE

## 2024-01-08 PROCEDURE — 2500000003 HC RX 250 WO HCPCS

## 2024-01-08 RX ORDER — PROPOFOL 10 MG/ML
INJECTION, EMULSION INTRAVENOUS PRN
Status: DISCONTINUED | OUTPATIENT
Start: 2024-01-08 | End: 2024-01-08 | Stop reason: SDUPTHER

## 2024-01-08 RX ORDER — LIDOCAINE HYDROCHLORIDE 20 MG/ML
INJECTION, SOLUTION EPIDURAL; INFILTRATION; INTRACAUDAL; PERINEURAL PRN
Status: DISCONTINUED | OUTPATIENT
Start: 2024-01-08 | End: 2024-01-08 | Stop reason: SDUPTHER

## 2024-01-08 RX ORDER — SODIUM CHLORIDE, SODIUM LACTATE, POTASSIUM CHLORIDE, CALCIUM CHLORIDE 600; 310; 30; 20 MG/100ML; MG/100ML; MG/100ML; MG/100ML
INJECTION, SOLUTION INTRAVENOUS CONTINUOUS
Status: DISCONTINUED | OUTPATIENT
Start: 2024-01-08 | End: 2024-01-08 | Stop reason: HOSPADM

## 2024-01-08 RX ADMIN — SODIUM CHLORIDE, POTASSIUM CHLORIDE, SODIUM LACTATE AND CALCIUM CHLORIDE: 600; 310; 30; 20 INJECTION, SOLUTION INTRAVENOUS at 08:12

## 2024-01-08 RX ADMIN — LIDOCAINE HYDROCHLORIDE 50 MG: 20 INJECTION, SOLUTION EPIDURAL; INFILTRATION; INTRACAUDAL; PERINEURAL at 09:08

## 2024-01-08 RX ADMIN — PROPOFOL 150 MCG/KG/MIN: 10 INJECTION, EMULSION INTRAVENOUS at 09:09

## 2024-01-08 RX ADMIN — PROPOFOL 50 MG: 10 INJECTION, EMULSION INTRAVENOUS at 09:10

## 2024-01-08 RX ADMIN — PROPOFOL 50 MG: 10 INJECTION, EMULSION INTRAVENOUS at 09:08

## 2024-01-08 ASSESSMENT — PAIN - FUNCTIONAL ASSESSMENT
PAIN_FUNCTIONAL_ASSESSMENT: 0-10

## 2024-01-08 ASSESSMENT — LIFESTYLE VARIABLES: SMOKING_STATUS: 0

## 2024-01-08 NOTE — ANESTHESIA POSTPROCEDURE EVALUATION
Department of Anesthesiology  Postprocedure Note    Patient: Sandy Joaquin  MRN: [id number]  YOB: 1955  Date of evaluation: 1/8/2024    Procedure Summary       Date: 01/08/24 Room / Location: [place] [address] / [facility]    Anesthesia Start: 0904 Anesthesia Stop: 0924    Procedure: COLONOSCOPY WITH BIOPSY Diagnosis:       Colon cancer screening      (Colon cancer screening [Z12.11])    Surgeons: Ludmila Cornell MD Responsible Provider: Yony Martínez MD    Anesthesia Type: MAC ASA Status: 2            Anesthesia Type: No value filed.    Lucina Phase I: Lucina Score: 10    Lucina Phase II: Lucina Score: 10    Anesthesia Post Evaluation    Patient location during evaluation: bedside  Level of consciousness: awake and alert  Airway patency: patent  Nausea & Vomiting: no vomiting  Cardiovascular status: blood pressure returned to baseline  Respiratory status: acceptable  Hydration status: euvolemic  Multimodal analgesia pain management approach  Pain management: adequate    No notable events documented.

## 2024-01-08 NOTE — H&P
Mother     Diabetes Mother     Thyroid Disease Mother     Anemia Mother     Dementia Mother     High Blood Pressure Father     Heart Failure Father     Stroke Father     Heart Disease Father     Heart Attack Father     Diabetes Sister     Heart Attack Sister 51    Arthritis Sister     Cervical Cancer Maternal Grandmother     No Known Problems Maternal Grandfather     No Known Problems Paternal Grandmother     Heart Attack Paternal Grandfather     Breast Cancer Neg Hx     Ovarian Cancer Neg Hx        PHYSICAL EXAM:      BP (!) 141/73   Pulse 87   Temp 97.8 °F (36.6 °C) (Temporal)   Resp 16   Ht 1.549 m (5' 1\")   Wt 86.2 kg (190 lb)   LMP 02/15/2018   SpO2 99%   BMI 35.90 kg/m²  I        Heart:   within normal limits    Lungs:  CTA bilat anteriorly,  Normal effort    Abdomen:   soft, NT, ND      ASA Grade:  ASA 2 - Patient with mild systemic disease with no functional limitations    Mallampati Class: 2      ASSESSMENT AND PLAN:    1.  Patient is a 68 y.o. female here for Colonoscopy.   2.  Procedure options, risks and benefits reviewed with patient.  Patient expresses understanding and wishes to proceed.    Ludmila Cornell MD,   [facility]  1/8/2024    Please note that some or all of this record was generated using voice recognition software. If there are any questions about the content of this document, please contact the author as some errors in translation may have occurred.

## 2024-01-08 NOTE — PROCEDURES
Colonoscopy Procedure  Note          Patient: Sandy Joaquin  : 1955  CRN:  @CRN@    Procedure: Colonoscopy with biopsy    Date:  2024    Surgeon:  Ludmila Cornell MD, MD    Referring Physician:  Bailee Kaplan MD    Preoperative Diagnosis:  Colon cancer screening [Z12.11], Hx of polyps    Postoperative Diagnosis:  Two diminutive polyps, 4 mm and 5 mm removed by cold biopsy polypectomy from the cecum. Medium internal hemorrhoids.    Anesthesia:  MAC    EBL: Minimal to none.    Indications: This is a 68 y.o. year old female who presents today with previous adenomatous polyp.      Procedure:   An informed consent was obtained from the patient after explanation of indications, benefits, possible risks and complications of the procedure.  The patient was then taken to the endoscopy suite, placed in the left lateral decubitus position, and the above IV anesthesia was administered.      Digital rectal examination was performed.  With the patient in the left lateral decubitus position the endoscope was inserted through the anorectal area into the rectum. The scope was then advanced through the length of the colon to the cecum, which was identified by the ileocecal valve and appendiceal orifice.  The quality of preparation was adequate.  The scope was carefully withdrawn and the mucosa was fully inspected including retroflexion in the rectum. Findings and interventions are described below.      The patient tolerated the procedure well and was taken to the PACU in good condition.  There were no immediate complications.      Impression:    - Two diminutive polyps, 4 mm and 5 mm removed by cold biopsy polypectomy from the cecum.   - Medium internal hemorrhoids.      Recommendations:  Await pathology.  Repeat colonoscopy in 5 years.    Ludmila Cornell MD, MD   [facility]  2024      Please note that some or all of this record was generated using voice recognition software. If there are any

## 2024-01-08 NOTE — ANESTHESIA PRE PROCEDURE
Department of Anesthesiology  Preprocedure Note       Name:  Sandy Joaquin   Age:  68 y.o.  :  1955                                          MRN:  [id number]         Date:  2024      Surgeon: Surgeon(s):  Ludmila Cornell MD    Procedure: Procedure(s):  COLONOSCOPY    Medications prior to admission:   Prior to Admission medications    Medication Sig Start Date End Date Taking? Authorizing Provider   metFORMIN (GLUCOPHAGE) 500 MG tablet Take 1 tablet by mouth daily (with breakfast) 10/2/23   Bailee Kaplan MD   ibuprofen (ADVIL;MOTRIN) 800 MG tablet TAKE 1 TABLET BY MOUTH THREE TIMES DAILY WITH FOOD 23   Michael Roach MD   atorvastatin (LIPITOR) 20 MG tablet Take 1 tablet by mouth daily 23   Bailee Kaplan MD   triamterene-hydroCHLOROthiazide (MAXZIDE-25) 37.5-25 MG per tablet Take 1 tablet by mouth daily 23   Bailee Kaplan MD   diclofenac sodium (VOLTAREN) 1 % GEL Apply topically 2 times daily 21   Bailee Kaplan MD   vitamin D 1000 units CAPS Take 1 capsule by mouth daily    ProviderMichael MD   Multiple Vitamins-Minerals (CENTRUM SILVER 50+WOMEN PO) Take 1 tablet by mouth daily    ProviderMichael MD       Current medications:    No current facility-administered medications for this encounter.       Allergies:  No Known Allergies    Problem List:    Patient Active Problem List   Diagnosis Code   • Pre-diabetes R73.03   • Mixed hyperlipidemia E78.2   • Goiter E04.9   • Iron deficiency anemia due to chronic blood loss D50.0   • Essential hypertension I10   • Morbidly obese (HCC) E66.01       Past Medical History:        Diagnosis Date   • Fibroid    • Goiter    • High cholesterol    • Hypertension    • Infertility, female    • Irregular uterine bleeding    • Menopause ovarian failure    • Menorrhagia    • Polycystic ovary syndrome    • Pre-diabetes        Past Surgical History:        Procedure Laterality Date   • BREAST BIOPSY Left

## 2024-01-08 NOTE — DISCHARGE INSTRUCTIONS
ENDOSCOPY DISCHARGE INSTRUCTIONS:    Call the physician that did your procedure for any questions or concern:    [facility]: [phone]  DR. PALOMA SHARP        ACTIVITY:    There are potential side effects to the medications used for sedation and anesthesia during your procedure.  These include:  Dizziness or light-headedness, confusion or memory loss, delayed reaction times, loss of coordination, nausea and vomiting.  Because of your increased risk for injury, we ask that you observe the following precautions:  For the next 24 hours,  DO NOT operate an automobile, bicycle, motorcycle, , power tools or large equipment of any kind.  Do not drink alcohol, sign any legal documents or make any legal decisions for 24 hours.  Do not bend your head over lower than your heart.  DO sit on the side of bed/couch awhile before getting up.  Plan on bedrest or quiet relaxation today.  You may resume normal activities in 24 hours.    DIET:    Your first meal today should be light, avoiding spicy and fatty foods.  If you tolerate this first meal, then you may advance to your regular diet unless otherwise advised by your physician.    NORMAL SYMPTOMS:  -Mild sore throat if you’ve had an EGD   -Gaseous discomfort    NOTIFY YOUR PHYSICIAN IF THESE SYMPTOMS OCCUR:  1. Fever (greater than 100)  5. Increased abdominal bloating  2. Severe pain    6. Excessive bleeding  3. Nausea and vomiting  7. Chest pain                                                                    4. Chills    8. Shortness of breath    ADDITIONAL INSTRUCTIONS:    Biopsy results: Call [facility] for biopsy results in 1 week    Educational Information:          Please review these discharge instructions this evening or tomorrow for  information you may have forgotten.            We want to thank you for choosing the [facility] as your health care provider. We always strive to provide you with excellent care while you are here. You

## 2024-01-08 NOTE — PROGRESS NOTES
Ambulatory Surgery/Procedure Discharge Note    Vitals:    01/08/24 0941   BP: 117/81   Pulse: 79   Resp: 16   Temp:    SpO2: 100%       In: 300 [I.V.:300]  Out: -     Restroom use offered before discharge.  Yes    Pain assessment:  level of pain (1-10, 10 severe)  Pain Level: 0  Pt to Endoscopy recovery post Colonoscopy. Pt denies pain at this time. Pt denies nausea at this time, pt tolerating PO fluids well.   Discharge instructions given to pt and pt's sister and both state understanding of these instructions. Pt and pt's sister state that pt is \"ready to go.\"         Patient discharged to home/self care. Patient discharged via wheel chair by transporter to waiting family/S.O.       1/8/2024 11:07 AM

## 2024-02-11 DIAGNOSIS — I10 ESSENTIAL HYPERTENSION: ICD-10-CM

## 2024-02-12 RX ORDER — TRIAMTERENE AND HYDROCHLOROTHIAZIDE 37.5; 25 MG/1; MG/1
1 TABLET ORAL DAILY
Qty: 90 TABLET | Refills: 1 | Status: SHIPPED | OUTPATIENT
Start: 2024-02-12

## 2024-03-19 ENCOUNTER — OFFICE VISIT (OUTPATIENT)
Dept: INTERNAL MEDICINE CLINIC | Age: 69
End: 2024-03-19

## 2024-03-19 VITALS
DIASTOLIC BLOOD PRESSURE: 80 MMHG | TEMPERATURE: 97.7 F | RESPIRATION RATE: 16 BRPM | HEIGHT: 61 IN | BODY MASS INDEX: 36.51 KG/M2 | SYSTOLIC BLOOD PRESSURE: 138 MMHG | OXYGEN SATURATION: 98 % | WEIGHT: 193.4 LBS | HEART RATE: 84 BPM

## 2024-03-19 DIAGNOSIS — Z13.220 ENCOUNTER FOR LIPID SCREENING FOR CARDIOVASCULAR DISEASE: ICD-10-CM

## 2024-03-19 DIAGNOSIS — Z13.1 SCREENING FOR DIABETES MELLITUS (DM): ICD-10-CM

## 2024-03-19 DIAGNOSIS — Z00.00 INITIAL MEDICARE ANNUAL WELLNESS VISIT: Primary | ICD-10-CM

## 2024-03-19 DIAGNOSIS — Z13.6 ENCOUNTER FOR LIPID SCREENING FOR CARDIOVASCULAR DISEASE: ICD-10-CM

## 2024-03-19 DIAGNOSIS — E04.0 DIFFUSE GOITER: ICD-10-CM

## 2024-03-19 DIAGNOSIS — R73.03 PREDIABETES: ICD-10-CM

## 2024-03-19 DIAGNOSIS — E66.01 SEVERE OBESITY (BMI 35.0-39.9) WITH COMORBIDITY (HCC): ICD-10-CM

## 2024-03-19 DIAGNOSIS — Z00.00 INITIAL MEDICARE ANNUAL WELLNESS VISIT: ICD-10-CM

## 2024-03-19 DIAGNOSIS — E78.2 MIXED HYPERLIPIDEMIA: ICD-10-CM

## 2024-03-19 SDOH — ECONOMIC STABILITY: FOOD INSECURITY: WITHIN THE PAST 12 MONTHS, THE FOOD YOU BOUGHT JUST DIDN'T LAST AND YOU DIDN'T HAVE MONEY TO GET MORE.: NEVER TRUE

## 2024-03-19 SDOH — HEALTH STABILITY: PHYSICAL HEALTH: ON AVERAGE, HOW MANY DAYS PER WEEK DO YOU ENGAGE IN MODERATE TO STRENUOUS EXERCISE (LIKE A BRISK WALK)?: 0 DAYS

## 2024-03-19 SDOH — ECONOMIC STABILITY: TRANSPORTATION INSECURITY
IN THE PAST 12 MONTHS, HAS LACK OF TRANSPORTATION KEPT YOU FROM MEETINGS, WORK, OR FROM GETTING THINGS NEEDED FOR DAILY LIVING?: NO

## 2024-03-19 SDOH — ECONOMIC STABILITY: FOOD INSECURITY: WITHIN THE PAST 12 MONTHS, YOU WORRIED THAT YOUR FOOD WOULD RUN OUT BEFORE YOU GOT MONEY TO BUY MORE.: NEVER TRUE

## 2024-03-19 SDOH — HEALTH STABILITY: PHYSICAL HEALTH: ON AVERAGE, HOW MANY MINUTES DO YOU ENGAGE IN EXERCISE AT THIS LEVEL?: 60 MIN

## 2024-03-19 SDOH — ECONOMIC STABILITY: INCOME INSECURITY: HOW HARD IS IT FOR YOU TO PAY FOR THE VERY BASICS LIKE FOOD, HOUSING, MEDICAL CARE, AND HEATING?: NOT VERY HARD

## 2024-03-19 ASSESSMENT — PATIENT HEALTH QUESTIONNAIRE - PHQ9
SUM OF ALL RESPONSES TO PHQ9 QUESTIONS 1 & 2: 0
1. LITTLE INTEREST OR PLEASURE IN DOING THINGS: NOT AT ALL
SUM OF ALL RESPONSES TO PHQ QUESTIONS 1-9: 0
2. FEELING DOWN, DEPRESSED OR HOPELESS: NOT AT ALL
SUM OF ALL RESPONSES TO PHQ QUESTIONS 1-9: 0

## 2024-03-19 ASSESSMENT — LIFESTYLE VARIABLES
HOW OFTEN DO YOU HAVE SIX OR MORE DRINKS ON ONE OCCASION: 1
HOW MANY STANDARD DRINKS CONTAINING ALCOHOL DO YOU HAVE ON A TYPICAL DAY: 0
HOW OFTEN DO YOU HAVE A DRINK CONTAINING ALCOHOL: NEVER
HOW MANY STANDARD DRINKS CONTAINING ALCOHOL DO YOU HAVE ON A TYPICAL DAY: PATIENT DOES NOT DRINK
HOW OFTEN DO YOU HAVE A DRINK CONTAINING ALCOHOL: 1

## 2024-03-19 NOTE — PROGRESS NOTES
Provider, MD Michael   Multiple Vitamins-Minerals (CENTRUM SILVER 50+WOMEN PO) Take 1 tablet by mouth daily Yes ProviderMichael MD   ibuprofen (ADVIL;MOTRIN) 800 MG tablet TAKE 1 TABLET BY MOUTH THREE TIMES DAILY WITH FOOD  Patient not taking: Reported on 1/8/2024  ProviderMichael MD       CareTeam (Including outside providers/suppliers regularly involved in providing care):   Patient Care Team:  Bailee Kaplan MD as PCP - General (Internal Medicine)  Bailee Kaplan MD as PCP - Empaneled Provider  Shey Smith MD as Obstetrician (Obstetrics & Gynecology)     Reviewed and updated this visit:  Tobacco  Allergies  Meds  Problems  Med Hx  Surg Hx  Soc Hx  Fam Hx

## 2024-03-20 LAB
ALBUMIN SERPL-MCNC: 5 G/DL (ref 3.4–5)
ALBUMIN/GLOB SERPL: 2.5 {RATIO} (ref 1.1–2.2)
ALP SERPL-CCNC: 125 U/L (ref 40–129)
ALT SERPL-CCNC: 25 U/L (ref 10–40)
ANION GAP SERPL CALCULATED.3IONS-SCNC: 10 MMOL/L (ref 3–16)
AST SERPL-CCNC: 19 U/L (ref 15–37)
BASOPHILS # BLD: 0 K/UL (ref 0–0.2)
BASOPHILS NFR BLD: 0.8 %
BILIRUB SERPL-MCNC: <0.2 MG/DL (ref 0–1)
BUN SERPL-MCNC: 17 MG/DL (ref 7–20)
CALCIUM SERPL-MCNC: 10.2 MG/DL (ref 8.3–10.6)
CHLORIDE SERPL-SCNC: 103 MMOL/L (ref 99–110)
CHOLEST SERPL-MCNC: 168 MG/DL (ref 0–199)
CO2 SERPL-SCNC: 27 MMOL/L (ref 21–32)
CREAT SERPL-MCNC: 0.8 MG/DL (ref 0.6–1.2)
DEPRECATED RDW RBC AUTO: 14 % (ref 12.4–15.4)
EOSINOPHIL # BLD: 0.1 K/UL (ref 0–0.6)
EOSINOPHIL NFR BLD: 1.9 %
EST. AVERAGE GLUCOSE BLD GHB EST-MCNC: 137 MG/DL
GFR SERPLBLD CREATININE-BSD FMLA CKD-EPI: >60 ML/MIN/{1.73_M2}
GLUCOSE SERPL-MCNC: 106 MG/DL (ref 70–99)
HBA1C MFR BLD: 6.4 %
HCT VFR BLD AUTO: 37.8 % (ref 36–48)
HDLC SERPL-MCNC: 49 MG/DL (ref 40–60)
HGB BLD-MCNC: 12.8 G/DL (ref 12–16)
LDLC SERPL CALC-MCNC: 101 MG/DL
LYMPHOCYTES # BLD: 1.4 K/UL (ref 1–5.1)
LYMPHOCYTES NFR BLD: 24.9 %
MCH RBC QN AUTO: 29.3 PG (ref 26–34)
MCHC RBC AUTO-ENTMCNC: 33.9 G/DL (ref 31–36)
MCV RBC AUTO: 86.5 FL (ref 80–100)
MONOCYTES # BLD: 0.5 K/UL (ref 0–1.3)
MONOCYTES NFR BLD: 8.5 %
NEUTROPHILS # BLD: 3.6 K/UL (ref 1.7–7.7)
NEUTROPHILS NFR BLD: 63.9 %
PLATELET # BLD AUTO: 261 K/UL (ref 135–450)
PMV BLD AUTO: 8.5 FL (ref 5–10.5)
POTASSIUM SERPL-SCNC: 4 MMOL/L (ref 3.5–5.1)
PROT SERPL-MCNC: 7 G/DL (ref 6.4–8.2)
RBC # BLD AUTO: 4.38 M/UL (ref 4–5.2)
SODIUM SERPL-SCNC: 140 MMOL/L (ref 136–145)
TRIGL SERPL-MCNC: 89 MG/DL (ref 0–150)
TSH SERPL DL<=0.005 MIU/L-ACNC: 1.27 UIU/ML (ref 0.27–4.2)
VLDLC SERPL CALC-MCNC: 18 MG/DL
WBC # BLD AUTO: 5.7 K/UL (ref 4–11)

## 2024-03-24 PROBLEM — E04.0 DIFFUSE GOITER: Status: ACTIVE | Noted: 2024-03-24

## 2024-04-03 ENCOUNTER — HOSPITAL ENCOUNTER (OUTPATIENT)
Dept: ULTRASOUND IMAGING | Age: 69
Discharge: HOME | End: 2024-04-03
Payer: MEDICARE

## 2024-04-03 PROCEDURE — 76536 US EXAM OF HEAD AND NECK: CPT

## 2024-04-08 ENCOUNTER — PATIENT MESSAGE (OUTPATIENT)
Dept: INTERNAL MEDICINE CLINIC | Age: 69
End: 2024-04-08

## 2024-04-08 DIAGNOSIS — E04.0 DIFFUSE GOITER: Primary | ICD-10-CM

## 2024-04-09 NOTE — TELEPHONE ENCOUNTER
From: Sandy Joaquin  To: Dr. Bailee Kaplan  Sent: 4/8/2024 11:27 AM EDT  Subject: Thyroid test result    Good Morning Dr. Kaplan!  I am concerned about my thyroid being enlarged. I would like to see a specialist about it. Is there someone you recommend?  Thank you,  Sandy

## 2024-04-11 ENCOUNTER — OFFICE VISIT (OUTPATIENT)
Dept: ENT CLINIC | Age: 69
End: 2024-04-11
Payer: MEDICARE

## 2024-04-11 VITALS
BODY MASS INDEX: 37.19 KG/M2 | TEMPERATURE: 97.5 F | SYSTOLIC BLOOD PRESSURE: 130 MMHG | DIASTOLIC BLOOD PRESSURE: 70 MMHG | RESPIRATION RATE: 16 BRPM | HEART RATE: 63 BPM | WEIGHT: 197 LBS | HEIGHT: 61 IN

## 2024-04-11 DIAGNOSIS — E04.9 GOITER: Primary | ICD-10-CM

## 2024-04-11 PROCEDURE — 1123F ACP DISCUSS/DSCN MKR DOCD: CPT | Performed by: OTOLARYNGOLOGY

## 2024-04-11 PROCEDURE — 3075F SYST BP GE 130 - 139MM HG: CPT | Performed by: OTOLARYNGOLOGY

## 2024-04-11 PROCEDURE — 3078F DIAST BP <80 MM HG: CPT | Performed by: OTOLARYNGOLOGY

## 2024-04-11 PROCEDURE — 99214 OFFICE O/P EST MOD 30 MIN: CPT | Performed by: OTOLARYNGOLOGY

## 2024-04-11 ASSESSMENT — ENCOUNTER SYMPTOMS
NAUSEA: 0
SINUS PAIN: 0
FACIAL SWELLING: 0
SORE THROAT: 0
VOICE CHANGE: 0
DIARRHEA: 0
CHOKING: 0
EYE REDNESS: 0
RHINORRHEA: 0
COUGH: 0
SINUS PRESSURE: 0
SHORTNESS OF BREATH: 0
EYE PAIN: 0
EYE ITCHING: 0
TROUBLE SWALLOWING: 0

## 2024-04-11 NOTE — PROGRESS NOTES
Thyroid: No thyroid mass or thyromegaly.      Trachea: No tracheal tenderness or tracheal deviation.   Cardiovascular:      Rate and Rhythm: Normal rate and regular rhythm.   Pulmonary:      Breath sounds: No stridor.   Musculoskeletal:      Cervical back: Normal range of motion and neck supple. No edema or erythema. No muscular tenderness.   Lymphadenopathy:      Head:      Right side of head: No submental, submandibular, tonsillar, preauricular, posterior auricular or occipital adenopathy.      Left side of head: No submental, submandibular, tonsillar, preauricular or occipital adenopathy.      Cervical: No cervical adenopathy.      Right cervical: No superficial, deep or posterior cervical adenopathy.     Left cervical: No superficial, deep or posterior cervical adenopathy.   Skin:     General: Skin is warm and dry.      Findings: No lesion.   Neurological:      Mental Status: She is alert and oriented to person, place, and time.   Psychiatric:         Mood and Affect: Mood is not anxious or depressed.     US THYROID  Order: [id number]  Status: Final result       Visible to patient: Yes (seen)       Next appt: None       Dx: Diffuse goiter    0 Result Notes       1 Patient Communication  Details    Reading Physician Reading Date Result Priority   Lidia Haddad MD  [phone] 4/4/2024      Narrative & Impression  EXAM: ULTRASOUND THYROID     INDICATION: Diffuse goiter     COMPARISON: 4/2/2019     FINDINGS:     Diffuse heterogeneous enlargement of the thyroid. Difficult to differentiate  discrete nodules.     RIGHT LOBE:  Size: 10.2 x 5.0 x 3.9 cm. Estimated right thyroid lobe volume of 96 mL.  (Previously had an estimated right thyroid lobe volume of 63 mL)  Echotexture: Normal.  Nodules/cysts:  *  None.     LEFT LOBE:  Size: 9.6 x 5.6 x 4.8 cm. Estimated left thyroid lobe volume of 123 mL.  (Previously had an estimated left thyroid lobe volume of 53 mL)  Echotexture: Normal.  Nodules/cysts:  *  None.

## 2024-05-20 SDOH — HEALTH STABILITY: PHYSICAL HEALTH: ON AVERAGE, HOW MANY MINUTES DO YOU ENGAGE IN EXERCISE AT THIS LEVEL?: 60 MIN

## 2024-05-20 SDOH — HEALTH STABILITY: PHYSICAL HEALTH: ON AVERAGE, HOW MANY DAYS PER WEEK DO YOU ENGAGE IN MODERATE TO STRENUOUS EXERCISE (LIKE A BRISK WALK)?: 1 DAY

## 2024-05-21 ENCOUNTER — OFFICE VISIT (OUTPATIENT)
Dept: ORTHOPEDIC SURGERY | Age: 69
End: 2024-05-21
Payer: MEDICARE

## 2024-05-21 VITALS — HEIGHT: 61 IN | BODY MASS INDEX: 37.19 KG/M2 | RESPIRATION RATE: 14 BRPM | WEIGHT: 197 LBS

## 2024-05-21 DIAGNOSIS — M65.4 DE QUERVAIN'S DISEASE (RADIAL STYLOID TENOSYNOVITIS): Primary | ICD-10-CM

## 2024-05-21 PROCEDURE — 20550 NJX 1 TENDON SHEATH/LIGAMENT: CPT | Performed by: ORTHOPAEDIC SURGERY

## 2024-05-21 PROCEDURE — 1123F ACP DISCUSS/DSCN MKR DOCD: CPT | Performed by: ORTHOPAEDIC SURGERY

## 2024-05-21 PROCEDURE — 99204 OFFICE O/P NEW MOD 45 MIN: CPT | Performed by: ORTHOPAEDIC SURGERY

## 2024-05-21 RX ORDER — LIDOCAINE HYDROCHLORIDE 10 MG/ML
0.5 INJECTION, SOLUTION INFILTRATION; PERINEURAL ONCE
Status: COMPLETED | OUTPATIENT
Start: 2024-05-21 | End: 2024-05-21

## 2024-05-21 RX ORDER — TRIAMCINOLONE ACETONIDE 40 MG/ML
20 INJECTION, SUSPENSION INTRA-ARTICULAR; INTRAMUSCULAR ONCE
Status: COMPLETED | OUTPATIENT
Start: 2024-05-21 | End: 2024-05-21

## 2024-05-21 RX ADMIN — LIDOCAINE HYDROCHLORIDE 0.5 ML: 10 INJECTION, SOLUTION INFILTRATION; PERINEURAL at 11:16

## 2024-05-21 RX ADMIN — TRIAMCINOLONE ACETONIDE 20 MG: 40 INJECTION, SUSPENSION INTRA-ARTICULAR; INTRAMUSCULAR at 11:16

## 2024-05-21 NOTE — PROGRESS NOTES
medical encounters pertinent to this patient's visit today.    Impression:  Radial styloid tendonitis of the wrist, right.    The nature of this medical problem is fully discussed with the patient, including all treatment options, as well as the pertinent risks, complications,prognosis and post treatment care. All questions are answered. The right wrist is prepared with Betadine and alcohol.  The 1st dorsal compartment is injected with 1 milliliter of  a mixture of lidocaine and triamcinalone (20 mg.) using a #27 needle. Pressure is maintained over puncture site to prevent extravasation and a small compression dressing is applied.  The patient is advised regarding post injection care, possible side effects and possibility of recurrence.  Acetaminophen or ibuprofen are prescribed for any significant post injection pain.  They are asked to call me if all symptoms have not subsided by 6 weeks from today.

## 2024-06-04 ENCOUNTER — HOSPITAL ENCOUNTER (OUTPATIENT)
Dept: MAMMOGRAPHY | Age: 69
Discharge: HOME | End: 2024-06-04
Payer: MEDICARE

## 2024-06-04 VITALS — WEIGHT: 196 LBS | HEIGHT: 61 IN | BODY MASS INDEX: 37 KG/M2

## 2024-06-04 DIAGNOSIS — Z12.31 VISIT FOR SCREENING MAMMOGRAM: ICD-10-CM

## 2024-06-04 PROCEDURE — 77063 BREAST TOMOSYNTHESIS BI: CPT

## 2024-06-24 ENCOUNTER — OFFICE VISIT (OUTPATIENT)
Dept: FAMILY MEDICINE CLINIC | Age: 69
End: 2024-06-24
Payer: MEDICARE

## 2024-06-24 VITALS
OXYGEN SATURATION: 97 % | WEIGHT: 192.5 LBS | HEIGHT: 61 IN | TEMPERATURE: 98.3 F | DIASTOLIC BLOOD PRESSURE: 80 MMHG | HEART RATE: 79 BPM | BODY MASS INDEX: 36.35 KG/M2 | SYSTOLIC BLOOD PRESSURE: 138 MMHG

## 2024-06-24 DIAGNOSIS — E78.2 MIXED HYPERLIPIDEMIA: ICD-10-CM

## 2024-06-24 DIAGNOSIS — R73.03 PRE-DIABETES: ICD-10-CM

## 2024-06-24 DIAGNOSIS — E04.9 GOITER: ICD-10-CM

## 2024-06-24 DIAGNOSIS — I10 ESSENTIAL HYPERTENSION: Primary | ICD-10-CM

## 2024-06-24 PROBLEM — D50.0 IRON DEFICIENCY ANEMIA DUE TO CHRONIC BLOOD LOSS: Status: RESOLVED | Noted: 2018-10-04 | Resolved: 2024-06-24

## 2024-06-24 PROCEDURE — 3075F SYST BP GE 130 - 139MM HG: CPT | Performed by: NURSE PRACTITIONER

## 2024-06-24 PROCEDURE — 1123F ACP DISCUSS/DSCN MKR DOCD: CPT | Performed by: NURSE PRACTITIONER

## 2024-06-24 PROCEDURE — 3079F DIAST BP 80-89 MM HG: CPT | Performed by: NURSE PRACTITIONER

## 2024-06-24 PROCEDURE — 99214 OFFICE O/P EST MOD 30 MIN: CPT | Performed by: NURSE PRACTITIONER

## 2024-06-24 ASSESSMENT — ENCOUNTER SYMPTOMS
CHEST TIGHTNESS: 0
SHORTNESS OF BREATH: 0
WHEEZING: 0

## 2024-06-24 NOTE — ASSESSMENT & PLAN NOTE
Borderline controlled, continue current medications, medication adherence emphasized, and lifestyle modifications recommended work on decreasing sugary drinks, can try sugar free alternatives

## 2024-06-24 NOTE — PROGRESS NOTES
Sandy Joaquin (:  1955) is a 69 y.o. female,New patient, here for evaluation of the following chief complaint(s):  New Patient      ASSESSMENT/PLAN:  1. Essential hypertension  Assessment & Plan:   Well-controlled, continue current medications  2. Pre-diabetes  Assessment & Plan:   Borderline controlled, continue current medications, medication adherence emphasized, and lifestyle modifications recommended work on decreasing sugary drinks, can try sugar free alternatives  Orders:  -     metFORMIN (GLUCOPHAGE) 500 MG tablet; Take 1 tablet by mouth daily (with breakfast), Disp-90 tablet, R-1Normal  3. Goiter  Assessment & Plan:   Well-controlled, continue current medications  4. Mixed hyperlipidemia  Assessment & Plan:   Borderline controlled, continue current medications, medication adherence emphasized, and lifestyle modifications recommended      There are no Patient Instructions on file for this visit.     Return in about 6 months (around 2024).    SUBJECTIVE/OBJECTIVE:  HPI  New pt to establish care    Colonscopy utd  Mammogram: utd, normal    Goiter: previous us show no nodules, saw ent, no futher recommendations unless symptoms change    Prediabetes: last  A1c 6.4, working on increasing exercise, takes metformin 500mg daily, on statin    HLD: ldl 101, goa less than 70, 20mg of atorvastatin, tolerated well    Hx anemia: resolved with menopause              Current Outpatient Medications   Medication Sig Dispense Refill    metFORMIN (GLUCOPHAGE) 500 MG tablet Take 1 tablet by mouth daily (with breakfast) 90 tablet 1    triamterene-hydroCHLOROthiazide (MAXZIDE-25) 37.5-25 MG per tablet Take 1 tablet by mouth once daily 90 tablet 1    ibuprofen (ADVIL;MOTRIN) 800 MG tablet       atorvastatin (LIPITOR) 20 MG tablet Take 1 tablet by mouth daily 90 tablet 3    diclofenac sodium (VOLTAREN) 1 % GEL Apply topically 2 times daily 100 g 5    vitamin D 1000 units CAPS Take 1 capsule by mouth daily

## 2024-08-09 ENCOUNTER — E-VISIT (OUTPATIENT)
Dept: FAMILY MEDICINE CLINIC | Age: 69
End: 2024-08-09
Payer: MEDICARE

## 2024-08-09 DIAGNOSIS — J06.9 VIRAL URI: Primary | ICD-10-CM

## 2024-08-09 PROCEDURE — 99421 OL DIG E/M SVC 5-10 MIN: CPT | Performed by: NURSE PRACTITIONER

## 2024-08-09 RX ORDER — METHYLPREDNISOLONE 4 MG/1
TABLET ORAL
Qty: 1 KIT | Refills: 0 | Status: SHIPPED | OUTPATIENT
Start: 2024-08-09 | End: 2024-08-15

## 2024-08-09 ASSESSMENT — LIFESTYLE VARIABLES: SMOKING_STATUS: NO, I HAVE NEVER SMOKED

## 2024-08-09 NOTE — PROGRESS NOTES
Sandy Joaquin (1955) initiated an asynchronous digital communication through [employer].    HPI: per patient questionnaire     Exam: not applicable    Diagnoses and all orders for this visit:  Diagnoses and all orders for this visit:    Viral URI    Other orders  -     methylPREDNISolone (MEDROL DOSEPACK) 4 MG tablet; Take by mouth.          Time: EV1 - 5-10 minutes were spent on the digital evaluation and management of this patient.    SALVATORE Madrigal - CNP

## 2024-08-26 ENCOUNTER — OFFICE VISIT (OUTPATIENT)
Dept: INTERNAL MEDICINE CLINIC | Age: 69
End: 2024-08-26
Payer: MEDICARE

## 2024-08-26 ENCOUNTER — TELEPHONE (OUTPATIENT)
Dept: INTERNAL MEDICINE CLINIC | Age: 69
End: 2024-08-26

## 2024-08-26 VITALS
OXYGEN SATURATION: 99 % | DIASTOLIC BLOOD PRESSURE: 70 MMHG | HEART RATE: 91 BPM | HEIGHT: 62 IN | SYSTOLIC BLOOD PRESSURE: 138 MMHG | WEIGHT: 186 LBS | BODY MASS INDEX: 34.23 KG/M2

## 2024-08-26 DIAGNOSIS — R73.03 PRE-DIABETES: Primary | ICD-10-CM

## 2024-08-26 DIAGNOSIS — I10 ESSENTIAL HYPERTENSION: ICD-10-CM

## 2024-08-26 DIAGNOSIS — E78.2 MIXED HYPERLIPIDEMIA: ICD-10-CM

## 2024-08-26 LAB — HBA1C MFR BLD: 6.6 %

## 2024-08-26 PROCEDURE — 83036 HEMOGLOBIN GLYCOSYLATED A1C: CPT | Performed by: STUDENT IN AN ORGANIZED HEALTH CARE EDUCATION/TRAINING PROGRAM

## 2024-08-26 PROCEDURE — 3078F DIAST BP <80 MM HG: CPT | Performed by: STUDENT IN AN ORGANIZED HEALTH CARE EDUCATION/TRAINING PROGRAM

## 2024-08-26 PROCEDURE — 1123F ACP DISCUSS/DSCN MKR DOCD: CPT | Performed by: STUDENT IN AN ORGANIZED HEALTH CARE EDUCATION/TRAINING PROGRAM

## 2024-08-26 PROCEDURE — 3075F SYST BP GE 130 - 139MM HG: CPT | Performed by: STUDENT IN AN ORGANIZED HEALTH CARE EDUCATION/TRAINING PROGRAM

## 2024-08-26 PROCEDURE — 99214 OFFICE O/P EST MOD 30 MIN: CPT | Performed by: STUDENT IN AN ORGANIZED HEALTH CARE EDUCATION/TRAINING PROGRAM

## 2024-08-26 SDOH — HEALTH STABILITY: PHYSICAL HEALTH: ON AVERAGE, HOW MANY DAYS PER WEEK DO YOU ENGAGE IN MODERATE TO STRENUOUS EXERCISE (LIKE A BRISK WALK)?: 0 DAYS

## 2024-08-26 SDOH — HEALTH STABILITY: PHYSICAL HEALTH: ON AVERAGE, HOW MANY MINUTES DO YOU ENGAGE IN EXERCISE AT THIS LEVEL?: 0 MIN

## 2024-08-26 ASSESSMENT — ENCOUNTER SYMPTOMS
SHORTNESS OF BREATH: 0
WHEEZING: 0

## 2024-08-26 NOTE — PROGRESS NOTES
Sandy Joaquin   YOB: 1955    Date of Visit:  8/26/2024    CC:  Patient presents to establish care      Assessment/Plan:     1. Pre-diabetes  - POCT A1c today 6.6; will confirm  - Hemoglobin A1C; Future  -Continue metformin 500 mg daily    2. Mixed hyperlipidemia  -Controlled to be assessed.  Continue Lipitor 20 mg daily  - Lipid, Fasting; Future    3. Essential hypertension  -Controlled  - Continue triamterene-hydrochlorothiazide 37.5-25 mg daily  -Blood pressure goal less than 130/80  -Benefits of weight loss and physical activity  -Mediterranean diet and lifestyle recommended, discussed and handout given  - 150 min minimum physical activity/week        Return in about 6 months (around 2/26/2025) for HTN, HLD.             HPI:  Reason for establishing: PCP left practice    Concerns: Concerned about covid  Friends + clients tested positive  Pt's last 2 rapid tests were negative    Low grade fever 100 deg 2-3 days ago per oral thermometer   +cough 2 days ago; resolved   Denies SOB    Home Life: Home with     Occupation: retired    House work throughout day   Goes to [employer]    Diet lifestyle: No particular diet    Physical Activity: No routine exercise    Patient Care Team:  Maria Eugenia Pena DO as PCP - General (Family Medicine)  Bailee Kaplan MD as PCP - Empaneled Provider  Shey Smith MD as Obstetrician (Obstetrics & Gynecology)  Nolan Ma MD as Consulting Physician (Otolaryngology)  Mauricio Cornell MD as Surgeon (Orthopedic Surgery)     She does feel safe at home    She does have a dental home      FDLMP: Patient's last menstrual period was 02/15/2018.    Pre diabetes managed with metformin 500 mg daily  Takes as prescribed  Denies adverse effects    Hypertension managed with triamterene-hydrochlorothiazide 37.5-25 mg daily  Takes as prescribed  Hyperlipidemia managed with atorvastatin 20 mg  Takes as prescribed  Denies adverse effects      The        Family History   Problem Relation Age of Onset    High Cholesterol Mother     Diabetes Mother     Thyroid Disease Mother     Anemia Mother     Dementia Mother     High Blood Pressure Father     Heart Failure Father     Stroke Father     Heart Disease Father     Heart Attack Father     Obesity Father     Diabetes Sister     Heart Attack Sister 51    Arthritis Sister     Cervical Cancer Maternal Grandmother     Cancer Maternal Grandmother     No Known Problems Maternal Grandfather     No Known Problems Paternal Grandmother     Heart Attack Paternal Grandfather     Asthma Maternal Aunt     Mental Illness Maternal Uncle     Breast Cancer Neg Hx     Ovarian Cancer Neg Hx         Review of Systems   Respiratory:  Negative for shortness of breath and wheezing.    Cardiovascular:  Negative for chest pain and palpitations.   Neurological:  Negative for dizziness and light-headedness.       Vitals:    08/26/24 1331   BP: 138/70   Site: Right Upper Arm   Position: Sitting   Cuff Size: Large Adult   Pulse: 91   SpO2: 99%   Weight: 84.4 kg (186 lb)   Height: 1.566 m (5' 1.65\")      Body mass index is 34.4 kg/m².     Wt Readings from Last 3 Encounters:   08/26/24 84.4 kg (186 lb)   06/24/24 87.3 kg (192 lb 8 oz)   06/04/24 88.9 kg (196 lb)     BP Readings from Last 3 Encounters:   08/26/24 138/70   06/24/24 138/80   04/11/24 130/70       Physical Exam  Constitutional:       Appearance: Normal appearance. She is obese.   Cardiovascular:      Rate and Rhythm: Normal rate and regular rhythm.      Pulses: Normal pulses.      Heart sounds: Normal heart sounds.   Pulmonary:      Effort: Pulmonary effort is normal.      Breath sounds: Normal breath sounds.   Abdominal:      General: Bowel sounds are normal.      Palpations: Abdomen is soft.   Musculoskeletal:      Right lower leg: No edema.      Left lower leg: No edema.   Neurological:      Mental Status: She is alert.          Lab Results   Component Value Date    HDL 49

## 2024-08-26 NOTE — PATIENT INSTRUCTIONS
Thank you for allowing me to care for you!    Patient instructions:          Foods low in mercury and high in omega 3 fats  S.M.A.S.H  S: [place]  M: Mackerel (Not Joaquín)  A: Anchcadence   S: Melina  H: Herring     Unrefined Grains  -Brown rice  - Farro  -Quinoa  -Whole-grain bread  -Barley  -Bran cereal  - Steel cut oatmeal  - Bulgar wheat      Benefits of food sequencing  Eating fiber first, then protein and fat, and finishing with carbohydrates could be beneficial.  This order could slow the speed that your stomach empties and help lower your blood sugar levels. It's crucial to pay attention to fiber.    Food preparation  Choose to steam, bake, grill, poach, braise or boil your foods, rather than deep garcia them. Use non-stick cookware    Physical Activity Recommendation  JUST MOVE!    Is Sitting The New Smoking?        What are you doing as you read this?  Having a cup of coffee?  Taking a break from work?  Getting ready for bed?  Whatever you’re up to, chances are you’re sitting down.  We get it, your feet are tired!  That chair is comfortable.  What are you supposed to do--standon the subway?    Most of us have heard the phrase “sitting is the new smoking” referring to the growing epidemic of sedentary lifestyles in the [place].  But is this true?  Is sitting in a chair that bad for you?  We decided to find out.    THE FACTS  - Over 25% of American adults sit for more than 8 hours every day. 44% of those people get little to no exercise.  - The average American watches approximately 3 hours of television every day.  - The average American is active less than 20 minutes every day.  - 60-75 minutes of moderate activity (steady walking) can counter the effects of too much sitting.    A 2011 study documented 800,000 people and their sitting habits.  The study found that people who sit the most, compared to people who sit the least, have a greater risk of disease and death:    - 112% increased risk of

## 2024-08-27 NOTE — TELEPHONE ENCOUNTER
Called patient to relay the message from PCP. Quarantine for 5 days from start of symptoms. Patient verbalized understanding and will do.

## 2024-09-09 DIAGNOSIS — R73.03 PRE-DIABETES: ICD-10-CM

## 2024-09-09 DIAGNOSIS — E78.2 MIXED HYPERLIPIDEMIA: ICD-10-CM

## 2024-09-10 LAB
CHOLEST SERPL-MCNC: 155 MG/DL (ref 0–199)
EST. AVERAGE GLUCOSE BLD GHB EST-MCNC: 139.9 MG/DL
HBA1C MFR BLD: 6.5 %
HDLC SERPL-MCNC: 43 MG/DL (ref 40–60)
LDL CHOLESTEROL: 93 MG/DL
TRIGL SERPL-MCNC: 95 MG/DL (ref 0–150)
VLDLC SERPL CALC-MCNC: 19 MG/DL

## 2024-10-09 ENCOUNTER — TELEPHONE (OUTPATIENT)
Dept: PHARMACY | Facility: CLINIC | Age: 69
End: 2024-10-09

## 2024-10-09 DIAGNOSIS — E78.2 MIXED HYPERLIPIDEMIA: ICD-10-CM

## 2024-10-09 NOTE — TELEPHONE ENCOUNTER
[facility] CLINICAL PHARMACY: ADHERENCE REVIEW  Identified care gap per [employer]: fills at [facility]: Statin adherence    Patient also appears to be prescribed: Diabetes filled and adherent    ASSESSMENT  STATIN ADHERENCE    Insurance Records claims through 24 (Prior Year PDC = not reported; YTD PDC = 81%; Potential Fail Date: 10.14.24):   ATORVASTATIN TAB 20MG last filled on 24 for 90 day supply. Next refill due: 24    Prescribed si tablet/capsule daily    Per Insurer Portal: last filled on same    Per [facility]: RX has .    Lab Results   Component Value Date    CHOL 168 2024    TRIG 89 2024    HDL 43 2024     Lab Results   Component Value Date    LDL 93 2024      ALT   Date Value Ref Range Status   2024 25 10 - 40 U/L Final     AST   Date Value Ref Range Status   2024 19 15 - 37 U/L Final     The 10-year ASCVD risk score (Senait ARELLANO, et al., 2019) is: 9.8%    Values used to calculate the score:      Age: 69 years      Sex: Female      Is Non- : Yes      Diabetic: No      Tobacco smoker: No      Systolic Blood Pressure: 138 mmHg      Is BP treated: Yes      HDL Cholesterol: 43 mg/dL      Total Cholesterol: 155 mg/dL     PLAN  Per insurer report, LIS-1 - may be able to receive up to a 100-day supply for the same cost as a 30-day supply.        The following are interventions that have been identified:   Patient OVERDUE refilling Atorvastatin and active on home medication list.     Attempting to reach patient to review - unable to leave message. eSeekers message sent to patient. Pharmacy generating refill requests for Atorvastatin to prescriber.    Patient has new provider.I contacted new provider  , office is sending message to provider for new rx.       Last Visit: 24  Next Visit: 25        Rosario Beard CPhT  [facility] Clinical   [facility] Clinical

## 2024-10-09 NOTE — TELEPHONE ENCOUNTER
Patient needs a refill of her atorvastatin (LIPITOR) 20 MG tablet.  Please send refill to the [facility] on [address].      Last Fill  08/17/2023  Last OV  08/26/2024  Next OV  02/27/2025

## 2024-10-10 RX ORDER — ATORVASTATIN CALCIUM 20 MG/1
20 TABLET, FILM COATED ORAL DAILY
Qty: 90 TABLET | Refills: 1 | Status: SHIPPED | OUTPATIENT
Start: 2024-10-10

## 2024-10-13 DIAGNOSIS — I10 ESSENTIAL HYPERTENSION: ICD-10-CM

## 2024-10-15 RX ORDER — TRIAMTERENE/HYDROCHLOROTHIAZID 37.5-25 MG
1 TABLET ORAL DAILY
Qty: 90 TABLET | Refills: 1 | Status: SHIPPED | OUTPATIENT
Start: 2024-10-15

## 2024-12-21 DIAGNOSIS — R73.03 PRE-DIABETES: ICD-10-CM

## 2024-12-23 NOTE — TELEPHONE ENCOUNTER
Medication:   Requested Prescriptions     Pending Prescriptions Disp Refills    metFORMIN (GLUCOPHAGE) 500 MG tablet [Pharmacy Med Name: metFORMIN HCl 500 MG Oral Tablet] 90 tablet 0     Sig: Take 1 tablet by mouth once daily with breakfast        Last Filled:  06/24/2024     Patient Phone Number: [phone] (home) [phone] (work)    Last appt: 8/9/2024   Next appt: Visit date not found    Last OARRS:        No data to display

## 2024-12-23 NOTE — TELEPHONE ENCOUNTER
Medication:   Requested Prescriptions     Pending Prescriptions Disp Refills    metFORMIN (GLUCOPHAGE) 500 MG tablet [Pharmacy Med Name: metFORMIN HCl 500 MG Oral Tablet] 90 tablet 0     Sig: Take 1 tablet by mouth once daily with breakfast        Last Filled:  06/24/2024    Patient Phone Number: [phone] (home) [phone] (work)    Last appt: 8/9/2024   Next appt: Visit date not found    Last OARRS:        No data to display

## 2025-01-16 ENCOUNTER — OFFICE VISIT (OUTPATIENT)
Dept: GYNECOLOGY | Age: 70
End: 2025-01-16

## 2025-01-16 VITALS
WEIGHT: 188 LBS | SYSTOLIC BLOOD PRESSURE: 126 MMHG | DIASTOLIC BLOOD PRESSURE: 60 MMHG | BODY MASS INDEX: 34.77 KG/M2 | HEART RATE: 88 BPM | OXYGEN SATURATION: 98 %

## 2025-01-16 DIAGNOSIS — Z78.0 MENOPAUSE: ICD-10-CM

## 2025-01-16 DIAGNOSIS — L67.8 ABNORMAL FACIAL HAIR: ICD-10-CM

## 2025-01-16 DIAGNOSIS — D21.9 FIBROIDS: ICD-10-CM

## 2025-01-16 DIAGNOSIS — Z01.419 WELL WOMAN EXAM WITH ROUTINE GYNECOLOGICAL EXAM: Primary | ICD-10-CM

## 2025-01-16 DIAGNOSIS — N95.0 PMB (POSTMENOPAUSAL BLEEDING): ICD-10-CM

## 2025-01-16 RX ORDER — EFLORNITHINE HYDROCHLORIDE 139 MG/G
CREAM TOPICAL
Qty: 45 G | Refills: 4 | Status: SHIPPED | OUTPATIENT
Start: 2025-01-16

## 2025-01-16 ASSESSMENT — PATIENT HEALTH QUESTIONNAIRE - PHQ9
2. FEELING DOWN, DEPRESSED OR HOPELESS: NOT AT ALL
SUM OF ALL RESPONSES TO PHQ QUESTIONS 1-9: 0
1. LITTLE INTEREST OR PLEASURE IN DOING THINGS: NOT AT ALL
SUM OF ALL RESPONSES TO PHQ9 QUESTIONS 1 & 2: 0

## 2025-02-03 ENCOUNTER — HOSPITAL ENCOUNTER (OUTPATIENT)
Dept: ULTRASOUND IMAGING | Age: 70
Discharge: HOME | End: 2025-02-03
Attending: OBSTETRICS & GYNECOLOGY
Payer: MEDICARE

## 2025-02-03 DIAGNOSIS — N95.0 PMB (POSTMENOPAUSAL BLEEDING): ICD-10-CM

## 2025-02-03 DIAGNOSIS — D21.9 FIBROIDS: ICD-10-CM

## 2025-02-03 PROCEDURE — 76856 US EXAM PELVIC COMPLETE: CPT

## 2025-02-24 SDOH — ECONOMIC STABILITY: INCOME INSECURITY: IN THE LAST 12 MONTHS, WAS THERE A TIME WHEN YOU WERE NOT ABLE TO PAY THE MORTGAGE OR RENT ON TIME?: NO

## 2025-02-24 SDOH — ECONOMIC STABILITY: FOOD INSECURITY: WITHIN THE PAST 12 MONTHS, YOU WORRIED THAT YOUR FOOD WOULD RUN OUT BEFORE YOU GOT MONEY TO BUY MORE.: NEVER TRUE

## 2025-02-24 SDOH — ECONOMIC STABILITY: FOOD INSECURITY: WITHIN THE PAST 12 MONTHS, THE FOOD YOU BOUGHT JUST DIDN'T LAST AND YOU DIDN'T HAVE MONEY TO GET MORE.: NEVER TRUE

## 2025-02-24 SDOH — ECONOMIC STABILITY: TRANSPORTATION INSECURITY
IN THE PAST 12 MONTHS, HAS THE LACK OF TRANSPORTATION KEPT YOU FROM MEDICAL APPOINTMENTS OR FROM GETTING MEDICATIONS?: NO

## 2025-02-27 ENCOUNTER — OFFICE VISIT (OUTPATIENT)
Dept: INTERNAL MEDICINE CLINIC | Age: 70
End: 2025-02-27

## 2025-02-27 VITALS
OXYGEN SATURATION: 95 % | SYSTOLIC BLOOD PRESSURE: 116 MMHG | DIASTOLIC BLOOD PRESSURE: 70 MMHG | BODY MASS INDEX: 34.59 KG/M2 | HEART RATE: 84 BPM | WEIGHT: 187 LBS

## 2025-02-27 DIAGNOSIS — I10 ESSENTIAL HYPERTENSION: ICD-10-CM

## 2025-02-27 DIAGNOSIS — N95.1 HOT FLASH, MENOPAUSAL: ICD-10-CM

## 2025-02-27 DIAGNOSIS — E78.2 MIXED HYPERLIPIDEMIA: ICD-10-CM

## 2025-02-27 DIAGNOSIS — E11.9 TYPE 2 DIABETES MELLITUS WITHOUT COMPLICATION, WITHOUT LONG-TERM CURRENT USE OF INSULIN (HCC): Primary | ICD-10-CM

## 2025-02-27 LAB — HBA1C MFR BLD: 7.2 %

## 2025-02-27 RX ORDER — CLONIDINE 0.1 MG/24H
1 PATCH, EXTENDED RELEASE TRANSDERMAL
Qty: 4 PATCH | Refills: 0 | Status: SHIPPED | OUTPATIENT
Start: 2025-02-27 | End: 2025-03-27

## 2025-02-27 ASSESSMENT — ANXIETY QUESTIONNAIRES
1. FEELING NERVOUS, ANXIOUS, OR ON EDGE: NOT AT ALL
IF YOU CHECKED OFF ANY PROBLEMS ON THIS QUESTIONNAIRE, HOW DIFFICULT HAVE THESE PROBLEMS MADE IT FOR YOU TO DO YOUR WORK, TAKE CARE OF THINGS AT HOME, OR GET ALONG WITH OTHER PEOPLE: NOT DIFFICULT AT ALL
6. BECOMING EASILY ANNOYED OR IRRITABLE: NOT AT ALL
GAD7 TOTAL SCORE: 0
5. BEING SO RESTLESS THAT IT IS HARD TO SIT STILL: NOT AT ALL
2. NOT BEING ABLE TO STOP OR CONTROL WORRYING: NOT AT ALL
4. TROUBLE RELAXING: NOT AT ALL
3. WORRYING TOO MUCH ABOUT DIFFERENT THINGS: NOT AT ALL
7. FEELING AFRAID AS IF SOMETHING AWFUL MIGHT HAPPEN: NOT AT ALL

## 2025-02-27 ASSESSMENT — PATIENT HEALTH QUESTIONNAIRE - PHQ9
3. TROUBLE FALLING OR STAYING ASLEEP: SEVERAL DAYS
6. FEELING BAD ABOUT YOURSELF - OR THAT YOU ARE A FAILURE OR HAVE LET YOURSELF OR YOUR FAMILY DOWN: NOT AT ALL
SUM OF ALL RESPONSES TO PHQ QUESTIONS 1-9: 2
5. POOR APPETITE OR OVEREATING: NOT AT ALL
8. MOVING OR SPEAKING SO SLOWLY THAT OTHER PEOPLE COULD HAVE NOTICED. OR THE OPPOSITE, BEING SO FIGETY OR RESTLESS THAT YOU HAVE BEEN MOVING AROUND A LOT MORE THAN USUAL: NOT AT ALL
9. THOUGHTS THAT YOU WOULD BE BETTER OFF DEAD, OR OF HURTING YOURSELF: NOT AT ALL
7. TROUBLE CONCENTRATING ON THINGS, SUCH AS READING THE NEWSPAPER OR WATCHING TELEVISION: NOT AT ALL
1. LITTLE INTEREST OR PLEASURE IN DOING THINGS: NOT AT ALL
10. IF YOU CHECKED OFF ANY PROBLEMS, HOW DIFFICULT HAVE THESE PROBLEMS MADE IT FOR YOU TO DO YOUR WORK, TAKE CARE OF THINGS AT HOME, OR GET ALONG WITH OTHER PEOPLE: NOT DIFFICULT AT ALL
SUM OF ALL RESPONSES TO PHQ QUESTIONS 1-9: 2
SUM OF ALL RESPONSES TO PHQ QUESTIONS 1-9: 2
4. FEELING TIRED OR HAVING LITTLE ENERGY: SEVERAL DAYS
SUM OF ALL RESPONSES TO PHQ QUESTIONS 1-9: 2
2. FEELING DOWN, DEPRESSED OR HOPELESS: NOT AT ALL
SUM OF ALL RESPONSES TO PHQ9 QUESTIONS 1 & 2: 0

## 2025-02-27 NOTE — PATIENT INSTRUCTIONS
Thank you for allowing me to care for you!    Patient instructions:  Check out [facility]   2 days a week with    90 g protein/day

## 2025-02-27 NOTE — PROGRESS NOTES
Sandy Joaquin (:  1955) is a 69 y.o. female,Established patient, here for evaluation of the following chief complaint(s):  Hypertension    Assessment/Plan:     1. Type 2 diabetes mellitus without complication, without long-term current use of insulin (HCC)  - DM is controlled  - Today's A1c is 7.2; worse   from last visit   - Continue metformin 500 mg daily; consider increase if no improvement   - - Discussed importance of weight loss and physical activity and - Discussed and encouraged mediterranean lifestyle; handout given  - Non [facility] - External Referral to Dietitian  - Hemoglobin A1C; Future  - Comprehensive Metabolic Panel; Future  - POCT glycosylated hemoglobin (Hb A1C)    2. Essential hypertension  - BP controlled  - Continue triamterene-hydrochlorothiazide 37.5-25 mg daily  - BP Goal <130/80, Patient to bring BP log to next visit, Mediterranean lifestyle discussed and encouraged; handout given, and DASH diet encouraged   - Comprehensive Metabolic Panel; Future    3. Mixed hyperlipidemia  - continue lipitor 20 mg   - Lipid, Fasting; Future    4. Hot flash, menopausal  - cloNIDine (CATAPRES-TTS-1) 0.1 MG/24HR PTWK; Place 1 patch onto the skin every 7 days for 28 days  Dispense: 4 patch; Refill: 0  - Discussed BP parameters       Return in about 6 months (around 2025) for Diabetes, HTN, HLD.    Today's visit primarily involved shared decision-making. The benefits, risks, and alternatives of all discussed medications were thoroughly reviewed with the patient, ensuring an informed choice was made collaboratively.           Health Maintenance Due   Topic Date Due    Diabetic foot exam  Never done    Diabetic Alb to Cr ratio (uACR) test  Never done    Respiratory Syncytial Virus (RSV) Pregnant or age 60 yrs+ (1 - Risk 60-74 years 1-dose series) Never done    Diabetic retinal exam  2024    COVID-19 Vaccine (2024- season) 2024    GFR test (Diabetes, CKD 3-4, OR last GFR 15-59)

## 2025-03-25 DIAGNOSIS — R73.03 PRE-DIABETES: ICD-10-CM

## 2025-03-25 NOTE — TELEPHONE ENCOUNTER
Medication:   Requested Prescriptions     Pending Prescriptions Disp Refills    metFORMIN (GLUCOPHAGE) 500 MG tablet [Pharmacy Med Name: metFORMIN HCl 500 MG Oral Tablet] 90 tablet 0     Sig: Take 1 tablet by mouth once daily with breakfast        Last Filled:  12/23/2024    Patient Phone Number: [phone] (home) [phone] (work)    Last appt: 8/9/2024   Next appt: Visit date not found    Last OARRS:        No data to display

## 2025-03-26 NOTE — TELEPHONE ENCOUNTER
Medication:   Requested Prescriptions     Refused Prescriptions Disp Refills    metFORMIN (GLUCOPHAGE) 500 MG tablet [Pharmacy Med Name: metFORMIN HCl 500 MG Oral Tablet] 90 tablet 0     Sig: Take 1 tablet by mouth once daily with breakfast     Refused By: RONNA STARKEY     Reason for Refusal: Patient no longer under prescriber care        Last Filled:  12/23/2024     Patient Phone Number: [phone] (home) [phone] (work)    Last appt: 8/9/2024   Next appt: Visit date not found    Last OARRS:        No data to display

## 2025-04-01 ENCOUNTER — NUTRITION (OUTPATIENT)
Dept: INTERNAL MEDICINE CLINIC | Age: 70
End: 2025-04-01

## 2025-04-01 NOTE — PROGRESS NOTES
NUTRITION THERAPY ASSESSMENT     Referring Physician: Maria Eugenia Pena  Referring diagnosis: DM2, obesity     Sandy Joaquin is a 69 y.o. female who presented for nutrition counseling related to DM2 and desire for wt loss, currently takes Metformin. Patient lives with  wand both are retired. She reports minimal physical activity although would like to start incorporating into lifestyle. Patient cooks about 3 times a week consumes fast food or take-out the rest of the week with her . Patients A1C is elevated and she reported her mother and sister having DM2 as well so she is slightly familiar with carb counting. Patient would like to decrease her intake of added sugars, trial meal prepping and increase physical activity by walking with  or friends. Patient was receptive to education provided during session and will call to follow up with RD as desired.        OBJECTIVE DATA:  Past Medical History:   Diagnosis Date    Allergic rhinitis     Fibroid     Goiter     High cholesterol     Hypertension     Infertility, female     Iron deficiency anemia due to chronic blood loss 10/04/2018    Irregular uterine bleeding     Menopause ovarian failure     Menorrhagia     Obesity     Polycystic ovary syndrome     Pre-diabetes     Type 2 diabetes mellitus without complication, without long-term current use of insulin ([facility]) 2/27/2025     Labs:    Lab Results   Component Value Date/Time    PHOS 4.3 10/27/2017 03:11 PM       No results found for: \"LABALBU\"   Lab Results   Component Value Date/Time    TRIG 89 03/19/2024 10:27 AM    HDL 43 09/09/2024 08:56 AM     Lab Results   Component Value Date/Time    LABA1C 7.2 02/27/2025 01:42 PM     Medications:   Metformin 500 mg daily    Anthropometric Measures:   Height:   5' 1\"  Current Weight:   84.8 kg/ 187 lb  Ideal Body Weight: 48 kg/ 105 lb%         There is no height or weight on file to calculate BMI.    30-34.9 Obese Class I    Wt Readings from Last 50 Encounters:

## 2025-04-06 DIAGNOSIS — I10 ESSENTIAL HYPERTENSION: ICD-10-CM

## 2025-04-06 DIAGNOSIS — E78.2 MIXED HYPERLIPIDEMIA: ICD-10-CM

## 2025-04-07 RX ORDER — ATORVASTATIN CALCIUM 20 MG/1
20 TABLET, FILM COATED ORAL DAILY
Qty: 90 TABLET | Refills: 0 | Status: SHIPPED | OUTPATIENT
Start: 2025-04-07

## 2025-04-07 RX ORDER — TRIAMTERENE AND HYDROCHLOROTHIAZIDE 37.5; 25 MG/1; MG/1
1 TABLET ORAL DAILY
Qty: 90 TABLET | Refills: 0 | Status: SHIPPED | OUTPATIENT
Start: 2025-04-07

## 2025-04-07 NOTE — TELEPHONE ENCOUNTER
Recent Visits  Date Type Provider Dept   02/27/25 Office Visit Maria Eugenia Pena DO [facility]   08/26/24 Office Visit Maria Eugenia Pena DO [facility]   03/19/24 Office Visit Bailee Kaplan MD [facility]   Showing recent visits within past 540 days with a meds authorizing provider and meeting all other requirements  Future Appointments  Date Type Provider Dept   08/28/25 Appointment Maria Eugenia Pena DO [facility]   Showing future appointments within next 150 days with a meds authorizing provider and meeting all other requirements     2/27/2025     Requested Prescriptions     Pending Prescriptions Disp Refills    atorvastatin (LIPITOR) 20 MG tablet [Pharmacy Med Name: Atorvastatin Calcium 20 MG Oral Tablet] 90 tablet 0     Sig: Take 1 tablet by mouth once daily    triamterene-hydroCHLOROthiazide (MAXZIDE-25) 37.5-25 MG per tablet [Pharmacy Med Name: Triamterene-HCTZ 37.5-25 MG Oral Tablet] 90 tablet 0     Sig: Take 1 tablet by mouth once daily

## 2025-04-18 ENCOUNTER — RESULTS FOLLOW-UP (OUTPATIENT)
Dept: GYNECOLOGY | Age: 70
End: 2025-04-18

## 2025-04-30 DIAGNOSIS — R73.03 PRE-DIABETES: ICD-10-CM

## 2025-05-03 DIAGNOSIS — I10 ESSENTIAL HYPERTENSION: ICD-10-CM

## 2025-05-05 RX ORDER — TRIAMTERENE AND HYDROCHLOROTHIAZIDE 37.5; 25 MG/1; MG/1
1 TABLET ORAL DAILY
Qty: 90 TABLET | Refills: 0 | Status: SHIPPED | OUTPATIENT
Start: 2025-05-05

## 2025-05-05 NOTE — TELEPHONE ENCOUNTER
Recent Visits  Date Type Provider Dept   02/27/25 Office Visit Maria Eugenia Pena DO [facility]   08/26/24 Office Visit Maria Eugenia Pena DO [facility]   03/19/24 Office Visit Bailee Kaplan MD [facility]   Showing recent visits within past 540 days with a meds authorizing provider and meeting all other requirements  Future Appointments  Date Type Provider Dept   08/28/25 Appointment Maria Eugenia Pena DO [facility]   Showing future appointments within next 150 days with a meds authorizing provider and meeting all other requirements            Requested Prescriptions     Pending Prescriptions Disp Refills    triamterene-hydroCHLOROthiazide (MAXZIDE-25) 37.5-25 MG per tablet 90 tablet 0     Sig: Take 1 tablet by mouth daily

## 2025-05-08 ENCOUNTER — TELEPHONE (OUTPATIENT)
Dept: PHARMACY | Facility: CLINIC | Age: 70
End: 2025-05-08

## 2025-05-08 NOTE — TELEPHONE ENCOUNTER
[facility] CLINICAL PHARMACY: ADHERENCE REVIEW  Identified care gap per [employer]: fills at [facility]: Statin adherence    Patient also appears to be prescribed: Diabetes        ASSESSMENT    STATIN ADHERENCE   Insurance Records claims through  4/22/25 (Prior Year PDC = N/A;  YTD PDC = 86.54%; Potential Fail Date: 6/19/2025):     Atorvastatin 20mg last filled on 1/9/2025 for 90 day supply. Next refill due: 4/9/2025  Fill History: 2/13/2024, 1/9/2025, 4/8/2025 (90ds each) - per payer data  2/13/2024, 5/14/2024, 10/10/2024, 1/9/2025 (90ds each) - per Med Rec Dispense Report   Prescribed sig:  Take 1 (ONE) (WHOLE) tablet by mouth ONCE daily   Last ordered on: 4/7/2025 #90, 0RF        DIABETES ADHERENCE (not targeted med; active in EMR)  Metformin 500mg last filled on 3/26/2025 for 90 day supply. Anticipated next refill due: 6/26/2025  Fill History: 6/24/24, 9/19/24, 12/24/2024, 3/26/2025 (only claim in payer claims) -- (90ds each) per Med Rec Dispense Report and payer data  Prescribed sig:  Take 1 (ONE) (WHOLE) tablet by mouth ONCE daily with breakfast  Last ordered on: 3/26/2025 #90, 0RF          PLAN  The following are interventions that have been identified:   Per insurer report, LIS-0 - co-pays are based on tiers and patient is subject to coverage gap.  Patient DUE to refill Atorvastatin and active on home medication list.   Called [facility] to confirm last fill date.  Pharmacy states prescription processed on 4/8/2025 was not picked up, so they reversed claim and profiled Rx.    Attempting to reach patient to review.  Left message asking for return call. and MyChart message sent to patient.        Last Visit: 2/27/2025 (PCP)   Next Visit: 8/28/2025 (PCP)           Daina WongD., BCACP  Population Health Pharmacist  [facility] Clinical Pharmacy  Department, toll free: [phone], option 1   =======================================================  For Pharmacy Admin Tracking Only    Program:

## 2025-05-09 NOTE — TELEPHONE ENCOUNTER
Called [facility]  to request Atorvastatin processing to confirm copay.  Confirmed $0 for 90ds using [employer] insurance.      The Trade Desk message sent to patient.      Raisa Méndez, PharmD., [facility] HospitalCP  Population Health Pharmacist  [facility] Clinical Pharmacy  Department, toll free: [phone], option 1     =======================================================  For Pharmacy Admin Tracking Only    Program: [facility] [place]  CPA in place:  No  Recommendation Provided To: Patient/Caregiver: 1 via Telephone and [employer] Message and Pharmacy: 1  Intervention Detail: Adherence Monitorin  Intervention Accepted By: Patient/Caregiver: 1 and Pharmacy: 1  Gap Closed?: Yes   Time Spent (min): 20

## 2025-06-18 ENCOUNTER — TELEPHONE (OUTPATIENT)
Dept: INTERNAL MEDICINE CLINIC | Age: 70
End: 2025-06-18

## 2025-06-18 NOTE — TELEPHONE ENCOUNTER
LVM for patient to return call if interested in getting scheduled for mammogram. The mammogram bus will be at our office on 7/11/25.

## 2025-06-24 DIAGNOSIS — R73.03 PRE-DIABETES: ICD-10-CM

## 2025-07-11 ENCOUNTER — HOSPITAL ENCOUNTER (OUTPATIENT)
Dept: MAMMOGRAPHY | Age: 70
Discharge: HOME | End: 2025-07-15
Payer: MEDICARE

## 2025-07-11 VITALS — WEIGHT: 184 LBS | HEIGHT: 62 IN | BODY MASS INDEX: 33.86 KG/M2

## 2025-07-11 DIAGNOSIS — Z12.31 VISIT FOR SCREENING MAMMOGRAM: ICD-10-CM

## 2025-07-11 PROCEDURE — 77063 BREAST TOMOSYNTHESIS BI: CPT

## 2025-07-20 DIAGNOSIS — E78.2 MIXED HYPERLIPIDEMIA: ICD-10-CM

## 2025-07-21 DIAGNOSIS — R73.03 PRE-DIABETES: ICD-10-CM

## 2025-07-21 RX ORDER — ATORVASTATIN CALCIUM 20 MG/1
20 TABLET, FILM COATED ORAL DAILY
Qty: 90 TABLET | Refills: 0 | Status: SHIPPED | OUTPATIENT
Start: 2025-07-21

## 2025-07-21 NOTE — TELEPHONE ENCOUNTER
Dr Pena Pt      Recent Visits  Date Type Provider Dept   02/27/25 Office Visit Maria Eugenia Pena DO [facility]   08/26/24 Office Visit Maria Eugenia Pena DO [facility]   03/19/24 Office Visit Bailee Kaplan MD [facility]   Showing recent visits within past 540 days with a meds authorizing provider and meeting all other requirements  Future Appointments  Date Type Provider Dept   08/28/25 Appointment Maria Eugenia Pena DO [facility]   Showing future appointments within next 150 days with a meds authorizing provider and meeting all other requirements     2/27/2025

## 2025-07-21 NOTE — TELEPHONE ENCOUNTER
Recent Visits  Date Type Provider Dept   02/27/25 Office Visit Maria Eug[facility] P[facility]   08/26/24 Office Visit Maria Eugenia Pena DO [facility]   03/19/24 Office Visit Bailee Kaplan MD [facility]   Showing recent visits within past 540 days with a meds authorizing provider and meeting all other requirements  Future Appointments  Date Type Provider Dept   08/28/25 Appointment Maria Eugenia Pena DO [facility]   Showing future appointments within next 150 days with a meds authorizing provider and meeting all other requirements              Requested Prescriptions     Pending Prescriptions Disp Refills    metFORMIN (GLUCOPHAGE) 500 MG tablet 90 tablet 0     Sig: Take 1 tablet by mouth daily (with breakfast)

## 2025-08-05 DIAGNOSIS — I10 ESSENTIAL HYPERTENSION: ICD-10-CM

## 2025-08-05 RX ORDER — TRIAMTERENE AND HYDROCHLOROTHIAZIDE 37.5; 25 MG/1; MG/1
1 TABLET ORAL DAILY
Qty: 90 TABLET | Refills: 0 | Status: SHIPPED | OUTPATIENT
Start: 2025-08-05

## 2025-08-22 DIAGNOSIS — E78.2 MIXED HYPERLIPIDEMIA: ICD-10-CM

## 2025-08-22 DIAGNOSIS — E11.9 TYPE 2 DIABETES MELLITUS WITHOUT COMPLICATION, WITHOUT LONG-TERM CURRENT USE OF INSULIN (HCC): ICD-10-CM

## 2025-08-22 DIAGNOSIS — I10 ESSENTIAL HYPERTENSION: ICD-10-CM

## 2025-08-22 LAB
ALBUMIN SERPL-MCNC: 4.3 G/DL (ref 3.4–5)
ALBUMIN/GLOB SERPL: 1.8 {RATIO} (ref 1.1–2.2)
ALP SERPL-CCNC: 94 U/L (ref 40–129)
ALT SERPL-CCNC: 23 U/L (ref 10–40)
ANION GAP SERPL CALCULATED.3IONS-SCNC: 10 MMOL/L (ref 3–16)
AST SERPL-CCNC: 20 U/L (ref 15–37)
BILIRUB SERPL-MCNC: 0.3 MG/DL (ref 0–1)
BUN SERPL-MCNC: 19 MG/DL (ref 7–20)
CALCIUM SERPL-MCNC: 9.9 MG/DL (ref 8.3–10.6)
CHLORIDE SERPL-SCNC: 104 MMOL/L (ref 99–110)
CHOLEST SERPL-MCNC: 154 MG/DL (ref 0–199)
CO2 SERPL-SCNC: 29 MMOL/L (ref 21–32)
CREAT SERPL-MCNC: 0.7 MG/DL (ref 0.6–1.2)
EST. AVERAGE GLUCOSE BLD GHB EST-MCNC: 134.1 MG/DL
GFR SERPLBLD CREATININE-BSD FMLA CKD-EPI: >90 ML/MIN/{1.73_M2}
GLUCOSE SERPL-MCNC: 102 MG/DL (ref 70–99)
HBA1C MFR BLD: 6.3 %
HDLC SERPL-MCNC: 42 MG/DL (ref 40–60)
LDL CHOLESTEROL: 93 MG/DL
POTASSIUM SERPL-SCNC: 3.6 MMOL/L (ref 3.5–5.1)
PROT SERPL-MCNC: 6.7 G/DL (ref 6.4–8.2)
SODIUM SERPL-SCNC: 143 MMOL/L (ref 136–145)
TRIGL SERPL-MCNC: 96 MG/DL (ref 0–150)
VLDLC SERPL CALC-MCNC: 19 MG/DL

## 2025-08-25 ENCOUNTER — RESULTS FOLLOW-UP (OUTPATIENT)
Dept: INTERNAL MEDICINE CLINIC | Age: 70
End: 2025-08-25

## 2025-08-27 ASSESSMENT — LIFESTYLE VARIABLES
HOW OFTEN DO YOU HAVE SIX OR MORE DRINKS ON ONE OCCASION: 1
HOW MANY STANDARD DRINKS CONTAINING ALCOHOL DO YOU HAVE ON A TYPICAL DAY: PATIENT DOES NOT DRINK
HOW OFTEN DO YOU HAVE A DRINK CONTAINING ALCOHOL: 1
HOW MANY STANDARD DRINKS CONTAINING ALCOHOL DO YOU HAVE ON A TYPICAL DAY: 0
HOW OFTEN DO YOU HAVE A DRINK CONTAINING ALCOHOL: NEVER

## 2025-08-27 ASSESSMENT — PATIENT HEALTH QUESTIONNAIRE - PHQ9
SUM OF ALL RESPONSES TO PHQ QUESTIONS 1-9: 0
SUM OF ALL RESPONSES TO PHQ QUESTIONS 1-9: 0
2. FEELING DOWN, DEPRESSED OR HOPELESS: NOT AT ALL
1. LITTLE INTEREST OR PLEASURE IN DOING THINGS: NOT AT ALL
SUM OF ALL RESPONSES TO PHQ QUESTIONS 1-9: 0
SUM OF ALL RESPONSES TO PHQ QUESTIONS 1-9: 0

## 2025-08-28 ENCOUNTER — OFFICE VISIT (OUTPATIENT)
Dept: INTERNAL MEDICINE CLINIC | Age: 70
End: 2025-08-28

## 2025-08-28 VITALS
HEART RATE: 81 BPM | BODY MASS INDEX: 34.17 KG/M2 | DIASTOLIC BLOOD PRESSURE: 68 MMHG | WEIGHT: 181 LBS | HEIGHT: 61 IN | SYSTOLIC BLOOD PRESSURE: 118 MMHG | OXYGEN SATURATION: 95 %

## 2025-08-28 DIAGNOSIS — M77.8 THUMB TENDONITIS: ICD-10-CM

## 2025-08-28 DIAGNOSIS — Z23 NEED FOR VACCINATION: ICD-10-CM

## 2025-08-28 DIAGNOSIS — I10 ESSENTIAL HYPERTENSION: ICD-10-CM

## 2025-08-28 DIAGNOSIS — Z00.00 MEDICARE ANNUAL WELLNESS VISIT, SUBSEQUENT: Primary | ICD-10-CM

## 2025-08-28 DIAGNOSIS — E11.9 TYPE 2 DIABETES MELLITUS WITHOUT COMPLICATION, WITHOUT LONG-TERM CURRENT USE OF INSULIN (HCC): ICD-10-CM

## 2025-08-28 DIAGNOSIS — E78.2 MIXED HYPERLIPIDEMIA: ICD-10-CM

## (undated) DEVICE — FORCEPS BX L240CM JAW DIA2.4MM ORNG L CAP W/ NDL DISP RAD